# Patient Record
Sex: MALE | Race: WHITE | NOT HISPANIC OR LATINO
[De-identification: names, ages, dates, MRNs, and addresses within clinical notes are randomized per-mention and may not be internally consistent; named-entity substitution may affect disease eponyms.]

---

## 2021-06-23 PROBLEM — Z00.00 ENCOUNTER FOR PREVENTIVE HEALTH EXAMINATION: Status: ACTIVE | Noted: 2021-06-23

## 2021-06-25 ENCOUNTER — APPOINTMENT (OUTPATIENT)
Dept: NEUROSURGERY | Facility: CLINIC | Age: 76
End: 2021-06-25
Payer: MEDICARE

## 2021-06-25 VITALS — HEIGHT: 66 IN | BODY MASS INDEX: 23.46 KG/M2 | WEIGHT: 146 LBS

## 2021-06-25 PROCEDURE — 99205 OFFICE O/P NEW HI 60 MIN: CPT

## 2021-06-25 PROCEDURE — 99072 ADDL SUPL MATRL&STAF TM PHE: CPT

## 2021-06-25 NOTE — HISTORY OF PRESENT ILLNESS
[FreeTextEntry1] : back pain [de-identified] : Mr. Adams is a lorena 76 year-old healthy, physically active male who presents with acute onset low back pain.  The pain radiates to his right lower abdomen.  It is severe.  He has no radicular component to the pain.  No numbness or weakness.  He denies unsteady gait.  Nothing has helped the pain.  It is made worse my laying down. 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  It extends out the foramen on the right

## 2021-06-25 NOTE — PLAN
[FreeTextEntry1] : I discussed the MRI and clinical findings with Mr. Adams and his daughter in detail.  The lesion is most consistent with schwanoma.  However, there is significant cord compression.  I believe surgery is indicated here.  I recommend laminectomy and resection.  I discussed the risks and benefits.  He will think about this option and call with his decision.

## 2021-08-06 ENCOUNTER — INPATIENT (INPATIENT)
Facility: HOSPITAL | Age: 76
LOS: 0 days | Discharge: HOME | End: 2021-08-07
Attending: INTERNAL MEDICINE | Admitting: INTERNAL MEDICINE
Payer: MEDICARE

## 2021-08-06 VITALS
DIASTOLIC BLOOD PRESSURE: 64 MMHG | HEART RATE: 78 BPM | WEIGHT: 134.92 LBS | RESPIRATION RATE: 18 BRPM | OXYGEN SATURATION: 99 % | SYSTOLIC BLOOD PRESSURE: 136 MMHG | TEMPERATURE: 98 F

## 2021-08-06 LAB
ALBUMIN SERPL ELPH-MCNC: 4.8 G/DL — SIGNIFICANT CHANGE UP (ref 3.5–5.2)
ALP SERPL-CCNC: 49 U/L — SIGNIFICANT CHANGE UP (ref 30–115)
ALT FLD-CCNC: 17 U/L — SIGNIFICANT CHANGE UP (ref 0–41)
ANION GAP SERPL CALC-SCNC: 9 MMOL/L — SIGNIFICANT CHANGE UP (ref 7–14)
APTT BLD: 24 SEC — LOW (ref 27–39.2)
AST SERPL-CCNC: 23 U/L — SIGNIFICANT CHANGE UP (ref 0–41)
BASOPHILS # BLD AUTO: 0.02 K/UL — SIGNIFICANT CHANGE UP (ref 0–0.2)
BASOPHILS NFR BLD AUTO: 0.4 % — SIGNIFICANT CHANGE UP (ref 0–1)
BILIRUB SERPL-MCNC: 1.8 MG/DL — HIGH (ref 0.2–1.2)
BLD GP AB SCN SERPL QL: SIGNIFICANT CHANGE UP
BLD GP AB SCN SERPL QL: SIGNIFICANT CHANGE UP
BUN SERPL-MCNC: 15 MG/DL — SIGNIFICANT CHANGE UP (ref 10–20)
CALCIUM SERPL-MCNC: 9.8 MG/DL — SIGNIFICANT CHANGE UP (ref 8.5–10.1)
CHLORIDE SERPL-SCNC: 99 MMOL/L — SIGNIFICANT CHANGE UP (ref 98–110)
CO2 SERPL-SCNC: 29 MMOL/L — SIGNIFICANT CHANGE UP (ref 17–32)
CREAT SERPL-MCNC: 0.9 MG/DL — SIGNIFICANT CHANGE UP (ref 0.7–1.5)
EOSINOPHIL # BLD AUTO: 0.04 K/UL — SIGNIFICANT CHANGE UP (ref 0–0.7)
EOSINOPHIL NFR BLD AUTO: 0.7 % — SIGNIFICANT CHANGE UP (ref 0–8)
GLUCOSE SERPL-MCNC: 87 MG/DL — SIGNIFICANT CHANGE UP (ref 70–99)
HCT VFR BLD CALC: 42.9 % — SIGNIFICANT CHANGE UP (ref 37–47)
HGB BLD-MCNC: 14.7 G/DL — SIGNIFICANT CHANGE UP (ref 12–16)
IMM GRANULOCYTES NFR BLD AUTO: 0.2 % — SIGNIFICANT CHANGE UP (ref 0.1–0.3)
INR BLD: 0.99 RATIO — SIGNIFICANT CHANGE UP (ref 0.65–1.3)
LYMPHOCYTES # BLD AUTO: 1.77 K/UL — SIGNIFICANT CHANGE UP (ref 1.2–3.4)
LYMPHOCYTES # BLD AUTO: 32.9 % — SIGNIFICANT CHANGE UP (ref 20.5–51.1)
MAGNESIUM SERPL-MCNC: 2.2 MG/DL — SIGNIFICANT CHANGE UP (ref 1.8–2.4)
MCHC RBC-ENTMCNC: 33 PG — HIGH (ref 27–31)
MCHC RBC-ENTMCNC: 34.3 G/DL — SIGNIFICANT CHANGE UP (ref 32–37)
MCV RBC AUTO: 96.2 FL — SIGNIFICANT CHANGE UP (ref 81–99)
MONOCYTES # BLD AUTO: 0.53 K/UL — SIGNIFICANT CHANGE UP (ref 0.1–0.6)
MONOCYTES NFR BLD AUTO: 9.9 % — HIGH (ref 1.7–9.3)
NEUTROPHILS # BLD AUTO: 3.01 K/UL — SIGNIFICANT CHANGE UP (ref 1.4–6.5)
NEUTROPHILS NFR BLD AUTO: 55.9 % — SIGNIFICANT CHANGE UP (ref 42.2–75.2)
NRBC # BLD: 0 /100 WBCS — SIGNIFICANT CHANGE UP (ref 0–0)
NT-PROBNP SERPL-SCNC: 228 PG/ML — SIGNIFICANT CHANGE UP (ref 0–300)
PLATELET # BLD AUTO: 230 K/UL — SIGNIFICANT CHANGE UP (ref 130–400)
POTASSIUM SERPL-MCNC: 4.8 MMOL/L — SIGNIFICANT CHANGE UP (ref 3.5–5)
POTASSIUM SERPL-SCNC: 4.8 MMOL/L — SIGNIFICANT CHANGE UP (ref 3.5–5)
PROT SERPL-MCNC: 6.8 G/DL — SIGNIFICANT CHANGE UP (ref 6–8)
PROTHROM AB SERPL-ACNC: 11.4 SEC — SIGNIFICANT CHANGE UP (ref 9.95–12.87)
RAPID RVP RESULT: SIGNIFICANT CHANGE UP
RBC # BLD: 4.46 M/UL — SIGNIFICANT CHANGE UP (ref 4.2–5.4)
RBC # FLD: 12.2 % — SIGNIFICANT CHANGE UP (ref 11.5–14.5)
SARS-COV-2 RNA SPEC QL NAA+PROBE: SIGNIFICANT CHANGE UP
SODIUM SERPL-SCNC: 137 MMOL/L — SIGNIFICANT CHANGE UP (ref 135–146)
TROPONIN T SERPL-MCNC: 0.02 NG/ML — HIGH
WBC # BLD: 5.38 K/UL — SIGNIFICANT CHANGE UP (ref 4.8–10.8)
WBC # FLD AUTO: 5.38 K/UL — SIGNIFICANT CHANGE UP (ref 4.8–10.8)

## 2021-08-06 PROCEDURE — 99285 EMERGENCY DEPT VISIT HI MDM: CPT

## 2021-08-06 PROCEDURE — 99222 1ST HOSP IP/OBS MODERATE 55: CPT | Mod: 57

## 2021-08-06 PROCEDURE — 93010 ELECTROCARDIOGRAM REPORT: CPT

## 2021-08-06 PROCEDURE — 99223 1ST HOSP IP/OBS HIGH 75: CPT

## 2021-08-06 PROCEDURE — 71045 X-RAY EXAM CHEST 1 VIEW: CPT | Mod: 26

## 2021-08-06 PROCEDURE — 93458 L HRT ARTERY/VENTRICLE ANGIO: CPT | Mod: 26

## 2021-08-06 RX ORDER — SODIUM CHLORIDE 9 MG/ML
1000 INJECTION, SOLUTION INTRAVENOUS
Refills: 0 | Status: DISCONTINUED | OUTPATIENT
Start: 2021-08-06 | End: 2021-08-06

## 2021-08-06 RX ORDER — LISINOPRIL 2.5 MG/1
10 TABLET ORAL DAILY
Refills: 0 | Status: DISCONTINUED | OUTPATIENT
Start: 2021-08-06 | End: 2021-08-07

## 2021-08-06 RX ORDER — ATORVASTATIN CALCIUM 80 MG/1
80 TABLET, FILM COATED ORAL AT BEDTIME
Refills: 0 | Status: DISCONTINUED | OUTPATIENT
Start: 2021-08-06 | End: 2021-08-06

## 2021-08-06 RX ORDER — PANTOPRAZOLE SODIUM 20 MG/1
40 TABLET, DELAYED RELEASE ORAL
Refills: 0 | Status: DISCONTINUED | OUTPATIENT
Start: 2021-08-06 | End: 2021-08-07

## 2021-08-06 RX ORDER — ATORVASTATIN CALCIUM 80 MG/1
40 TABLET, FILM COATED ORAL AT BEDTIME
Refills: 0 | Status: DISCONTINUED | OUTPATIENT
Start: 2021-08-06 | End: 2021-08-06

## 2021-08-06 RX ORDER — SODIUM CHLORIDE 9 MG/ML
1000 INJECTION INTRAMUSCULAR; INTRAVENOUS; SUBCUTANEOUS
Refills: 0 | Status: DISCONTINUED | OUTPATIENT
Start: 2021-08-06 | End: 2021-08-06

## 2021-08-06 RX ORDER — LANOLIN ALCOHOL/MO/W.PET/CERES
3 CREAM (GRAM) TOPICAL AT BEDTIME
Refills: 0 | Status: DISCONTINUED | OUTPATIENT
Start: 2021-08-06 | End: 2021-08-07

## 2021-08-06 RX ORDER — ASPIRIN/CALCIUM CARB/MAGNESIUM 324 MG
81 TABLET ORAL DAILY
Refills: 0 | Status: DISCONTINUED | OUTPATIENT
Start: 2021-08-06 | End: 2021-08-07

## 2021-08-06 RX ORDER — ENOXAPARIN SODIUM 100 MG/ML
40 INJECTION SUBCUTANEOUS DAILY
Refills: 0 | Status: DISCONTINUED | OUTPATIENT
Start: 2021-08-06 | End: 2021-08-07

## 2021-08-06 RX ORDER — ATORVASTATIN CALCIUM 80 MG/1
80 TABLET, FILM COATED ORAL AT BEDTIME
Refills: 0 | Status: DISCONTINUED | OUTPATIENT
Start: 2021-08-06 | End: 2021-08-07

## 2021-08-06 RX ORDER — ACETAMINOPHEN 500 MG
650 TABLET ORAL EVERY 6 HOURS
Refills: 0 | Status: DISCONTINUED | OUTPATIENT
Start: 2021-08-06 | End: 2021-08-07

## 2021-08-06 RX ORDER — ONDANSETRON 8 MG/1
4 TABLET, FILM COATED ORAL EVERY 8 HOURS
Refills: 0 | Status: DISCONTINUED | OUTPATIENT
Start: 2021-08-06 | End: 2021-08-07

## 2021-08-06 RX ORDER — OXYCODONE AND ACETAMINOPHEN 5; 325 MG/1; MG/1
1 TABLET ORAL EVERY 6 HOURS
Refills: 0 | Status: DISCONTINUED | OUTPATIENT
Start: 2021-08-06 | End: 2021-08-07

## 2021-08-06 RX ORDER — AMLODIPINE BESYLATE 2.5 MG/1
5 TABLET ORAL DAILY
Refills: 0 | Status: DISCONTINUED | OUTPATIENT
Start: 2021-08-06 | End: 2021-08-07

## 2021-08-06 RX ORDER — SODIUM CHLORIDE 9 MG/ML
1000 INJECTION INTRAMUSCULAR; INTRAVENOUS; SUBCUTANEOUS
Refills: 0 | Status: DISCONTINUED | OUTPATIENT
Start: 2021-08-06 | End: 2021-08-07

## 2021-08-06 RX ORDER — METOPROLOL TARTRATE 50 MG
25 TABLET ORAL EVERY 12 HOURS
Refills: 0 | Status: DISCONTINUED | OUTPATIENT
Start: 2021-08-06 | End: 2021-08-06

## 2021-08-06 RX ORDER — METOPROLOL TARTRATE 50 MG
12.5 TABLET ORAL
Refills: 0 | Status: DISCONTINUED | OUTPATIENT
Start: 2021-08-06 | End: 2021-08-07

## 2021-08-06 RX ADMIN — OXYCODONE AND ACETAMINOPHEN 1 TABLET(S): 5; 325 TABLET ORAL at 20:08

## 2021-08-06 RX ADMIN — ATORVASTATIN CALCIUM 80 MILLIGRAM(S): 80 TABLET, FILM COATED ORAL at 22:18

## 2021-08-06 RX ADMIN — SODIUM CHLORIDE 100 MILLILITER(S): 9 INJECTION INTRAMUSCULAR; INTRAVENOUS; SUBCUTANEOUS at 17:17

## 2021-08-06 RX ADMIN — OXYCODONE AND ACETAMINOPHEN 1 TABLET(S): 5; 325 TABLET ORAL at 21:07

## 2021-08-06 NOTE — H&P ADULT - NSHPSOCIALHISTORY_GEN_ALL_CORE
Denies smoking, alcohol or illicit drug use. Smoked occasionally over 50 years ago. Drinks only socially and lives with family. Fully functional and mobile.

## 2021-08-06 NOTE — H&P ADULT - NSHPLABSRESULTS_GEN_ALL_CORE
14.7   5.38  )-----------( 230      ( 06 Aug 2021 11:00 )             42.9       08-06    137  |  99  |  15  ----------------------------<  87  4.8   |  29  |  0.9    Ca    9.8      06 Aug 2021 11:00  Mg     2.2     08-06    TPro  6.8  /  Alb  4.8  /  TBili  1.8<H>  /  DBili  x   /  AST  23  /  ALT  17  /  AlkPhos  49  08-06                      CARDIAC MARKERS ( 06 Aug 2021 11:00 )  x     / 0.02 ng/mL / x     / x     / x            CAPILLARY BLOOD GLUCOSE

## 2021-08-06 NOTE — CONSULT NOTE ADULT - ASSESSMENT
Impression:    CAD, r/o ACS  Geo    Reccomendations:  -cath today in 2 hours as add-on  -start IVF  -keep NPO  -rapid COVID swab for cath, to be done in house  -get CMP, CBC, T&S, cardiac enzymes  -get EKG Impression:    CAD, r/o ACS  Geo    Reccomendations:  -cath today in 2 hours as add-on  -start IVF  -keep NPO  -rapid COVID swab for cath, to be done in house  -get CMP, CBC, T&S, cardiac enzymes  -get EKG  -further recs after cath Impression:    CAD, RCA disease corroborated on cath today  Geo    Reccomendations:  -cath today showed extensive RCA disease: ostial, mRCA and dRCA  -IVF  -increase statin to 80  -start metoprolol  -will discuss further with neurosurgery about options regarding procedure

## 2021-08-06 NOTE — CHART NOTE - NSCHARTNOTEFT_GEN_A_CORE
PRE-OP DIAGNOSIS: Abnormal CCTA    PROCEDURE:[X] LHC                         [X] Coronary angiography                         [  ] [facility]    Physician: Dr. Lopez  Interventional Fellow: Dr. Castillo   Fellow: Dr. Marie    ANESTHESIA TYPE:  [  ]General Anesthesia  [ x ] Sedation  [  ] Local/Regional    ESTIMATED BLOOD LOSS:    10   mL    CONDITION  [  ] Critical  [  ] Serious  [  ]Fair  [ x ]Good    IV CONTRAST:    60         mL    FINDINGS  Left Heart Catheterization:  LVEF%:  LVEDP: normal  [ ] Normal Coronary Arteries  [ ] Luminal Irregularities  [ ] Non-obstructive CAD    ACCESS:    [X] right radial artery  [ ] right femoral artery    HEMOSTASIS:    [X] D-Stat  [ ] Perclose  [ ] Manual compression    LEFT HEART CATHETERIZATION                                    Left main: Normal    LAD: Mild diffuse disease  Diag1: Moderate disease in proximal segment    Left Circumflex: Mild disease  OM: Mild disease    Right Coronary Artery:       Ostial: 70-80% stenosis       Prox: 60-70% stenosis       Dist: 90% stenosis    SYNTAX I/II SCORE:    INTERVENTION: none  IMPLANTS: none    APPROPRIATE USE CRITERIA (AUC):    SPECIMEN REMOVED: N/A    RIGHT HEART CATHETERIZATION  PA:  PCW:  CO/CI:      POST-OP DIAGNOSIS  Significant RCA        PLAN OF CARE  [X] NS at 100cc/hr for 6 hours  [X] Aspirin 81mg daily  [X] Start Atorvastatin 80mg daily and metoprolol 25mg q12h  [X] Will discuss with neurosurgery and make decision regarding revascularization PRE-OP DIAGNOSIS: Abnormal CCTA    PROCEDURE:[X] LHC                         [X] Coronary angiography                         [  ] [facility]    Physician: Dr. Lopez  Interventional Fellow: Dr. Castillo   Fellow: Dr. Marei    ANESTHESIA TYPE:  [  ]General Anesthesia  [ x ] Sedation  [  ] Local/Regional    ESTIMATED BLOOD LOSS:    10   mL    CONDITION  [  ] Critical  [  ] Serious  [  ]Fair  [ x ]Good    IV CONTRAST:    60         mL    FINDINGS  Left Heart Catheterization:  LVEF%:  LVEDP: normal  [ ] Normal Coronary Arteries  [ ] Luminal Irregularities  [ ] Non-obstructive CAD    ACCESS:    [X] right radial artery  [ ] right femoral artery    HEMOSTASIS:    [X] D-Stat  [ ] Perclose  [ ] Manual compression    LEFT HEART CATHETERIZATION                                    Left main: Normal    LAD: Mild diffuse disease  Diag1: Moderate disease in proximal segment    Left Circumflex: Mild disease  OM: Mild disease    Right Coronary Artery:       Ostial: 70-80% stenosis       Prox: 60-70% stenosis       Dist: 90% stenosis    SYNTAX I/II SCORE:    INTERVENTION: none  IMPLANTS: none    APPROPRIATE USE CRITERIA (AUC):    SPECIMEN REMOVED: N/A    RIGHT HEART CATHETERIZATION  PA:  PCW:  CO/CI:      POST-OP DIAGNOSIS  Significant RCA disease        PLAN OF CARE  [X] NS at 100cc/hr for 6 hours  [X] Aspirin 81mg daily  [X] Start Atorvastatin 80mg daily and metoprolol 25mg q12h  [X] Will discuss with neurosurgery and make decision regarding revascularization PRE-OP DIAGNOSIS: Abnormal CCTA    PROCEDURE:[X] LHC                         [X] Coronary angiography                         [  ] [facility]    Physician: Dr. Lopez  Interventional Fellow: Dr. Castillo   Fellow: Dr. Marie    ANESTHESIA TYPE:  [  ]General Anesthesia  [ x ] Sedation  [  ] Local/Regional    ESTIMATED BLOOD LOSS:    10   mL    CONDITION  [  ] Critical  [  ] Serious  [  ]Fair  [ x ]Good    IV CONTRAST:    60         mL    FINDINGS  Left Heart Catheterization:  LVEF%:  LVEDP: normal  [ ] Normal Coronary Arteries  [ ] Luminal Irregularities  [ ] Non-obstructive CAD    ACCESS:    [X] right radial artery  [ ] right femoral artery    HEMOSTASIS:    [X] D-Stat  [ ] Perclose  [ ] Manual compression    LEFT HEART CATHETERIZATION                                    Left main: Normal    LAD: Mild diffuse disease  Diag1: Moderate disease in proximal segment    Left Circumflex: Mild disease  OM: Mild disease    Right Coronary Artery:       Ostial: 70-80% stenosis       Prox: 60-70% stenosis       Dist: 90% stenosis    SYNTAX I/II SCORE:    INTERVENTION: none  IMPLANTS: none    APPROPRIATE USE CRITERIA (AUC):    SPECIMEN REMOVED: N/A    RIGHT HEART CATHETERIZATION  PA:  PCW:  CO/CI:      POST-OP DIAGNOSIS  Significant RCA disease        PLAN OF CARE  [X] NS at 100cc/hr for 6 hours  [X] Aspirin 81mg daily  [X] Start Atorvastatin 80mg daily and metoprolol 12. 5mg q12h  [X] Will discuss with neurosurgery and make decision regarding revascularization

## 2021-08-06 NOTE — H&P ADULT - ATTENDING COMMENTS
Patient seen and examined on 08/06/2021 at 23:35    HPI:  76 year old gentleman with a past medical history of HTN, DLD, recently diagnosed Schwannoma presents after having a positive CCTA.  He was found to have a positive CCTA on outpatient pre-op work up (was to have his schwannoma resected by Dr Ann). He reports occasional chest pressure, shortness of breath, and palpitations with exertion at home. Currently he feels well. He does have significant back pain since the diagnosis of the schwannoma.  Underwent cardiac cath earlier today with revealed severe RCA disease but no intervention was done.     REVIEW OF SYSTEMS:  CONSTITUTIONAL:  No weakness, fevers, chills, night sweats, weight loss  EYES/ENT: No visual changes. No vertigo or dysphagia  NECK: No neck pain or stiffness  RESPIRATORY: No cough, wheezing, hemoptysis. +shortness of breath  CARDIOVASCULAR: +chest pain +palpitations. No lower extremity edema  GASTROINTESTINAL: No abdominal pain. No nausea, vomiting, diarrhea, or hematemesis  GENITOURINARY: No dysuria or hematuria   NEUROLOGICAL: No focal numbness or weakness  MSK +back pain   SKIN: No rashes or itching  HEMATOLOGIC: No easy bruising or prolonged bleeding.      PHYSICAL EXAM:  GENERAL: NAD, well-developed, Non-toxic, stated age   HEAD:  Atraumatic, Normocephalic  EYES: EOMI, Sclera White   NECK: Supple, No JVD  CHEST/LUNG: Clear to auscultation bilaterally; No wheezing, rhonchi, or crackles  HEART: Regular rate and rhythm; s1, s2, No murmurs, rubs, or gallops  ABDOMEN: Soft, Nontender, Nondistended; Bowel sounds present, No rebound or guarding noted   EXTREMITIES:  No lower extremity edema or calf tenderness to palpation.  No clubbing or cyanosis  PSYCH: AAOx3, pleasant, cooperative, not anxious  NEUROLOGY: non-focal  SKIN: No rashes or lesions      ASSESSMENT AND PLAN:  CAD with RCA severe disease: currently asymptomatic  Elevated trops  HTN / HLD  -Cardio following: need to reach out to neuro sx to coordinate stent placement with back surgery (he will to remain on DAPT after stenting, so teams to decide which procedure to perform first).  -Cont with metoprolol 12.5mg BID, lipitor 80mg, lisinopril 10mg, and ASA 81mg.   -AM EKG, cont tele monitoring. Check 2D echo     Schwannoma  Chronic back pain  -Cont with percocet PRN, start senna and miralax  -Neuro Sx to coordinate with cardiology regarding surgery.     DVT ppx: Lovenox/Heparin  GI ppx: Not indicated  GOC: Full code.    My note supersedes the residents in the event of a discrepancy.

## 2021-08-06 NOTE — ED ADULT TRIAGE NOTE - CHIEF COMPLAINT QUOTE
midsternal chest pain radiating to left arm x 1 week, denies fevers, recent CTA shows blockage, MD Lopez told patient to come in for evaluation

## 2021-08-06 NOTE — ED PROVIDER NOTE - ATTENDING CONTRIBUTION TO CARE
77 y/o m w/ pmhx of HTN, HLD, recent pre op evaluation for schwannoma removal revealing CAD after a positive CCTA showing severe RCA stenosis and moderate LCX stenosis. pt was having back paiin and has reporting pain to chest , was found to have schwannoma and was sent for ccta due to various symptoms and pain, daughter spoke to cardiologist Dr. Lopez who sent pt in for pre op for cath. No fever, chills, n/v, current cp,  pleuritic cp, sob, palpitations, diaphoresis, cough, ha/lh/dizziness, neck pain/ stiffness, abd pain, diarrhea, constipation, melena/brbpr, urinary symptoms, trauma, weakness, edema, calf pain/swelling/erythema, sick contacts, recent travel or rash. smoked in his teenage for years for few years.    Vital Signs: I have reviewed the initial vital signs. Constitutional: WDWN in nad. Sitting on stretcher speaking full sentences. Integumentary: No rash. ENT: MMM NECK: Supple, non-tender, no meningeal signs. Cardiovascular: RRR, radial pulses 2/4 b/l. No JVD. Respiratory: BS present b/l, ctabl, no wheezing or crackles, no accessory muscle use, no stridor. Gastrointestinal: BS present throughout all 4 quadrants, soft, nd, nt, no rebound tenderness or guarding, no cvat. Musculoskeletal: FROM, no edema, no calf pain/swelling/erythema. Neurologic: AAOx3, motor 5/5 and sensation intact throughout upper and lowe ext, CN II-XII intact, No facial droop or slurring of speech. No focal deficits.

## 2021-08-06 NOTE — H&P ADULT - NSHPPHYSICALEXAM_GEN_ALL_CORE
GENERAL: NAD, lying in bed comfortably  HEAD:  Atraumatic, Normocephalic  EYES: EOMI, PERRLA, conjunctiva and sclera clear  CHEST/LUNG: Clear to auscultation bilaterally  HEART: Regular rate and rhythm  ABDOMEN: Bowel sounds present; Soft, Nontender, Nondistended  EXTREMITIES No peripheral edema  NERVOUS SYSTEM:  Alert & Oriented X3, speech clear. No motor deficits   MSK: Tenderness to lower back and paraspinal areas  SKIN: No rashes or lesions

## 2021-08-06 NOTE — ED PROVIDER NOTE - CARE PLAN
Assessment and plan of treatment:	Plan: Monitor, EKG, CXR, labs, cardiology consult, reassess.   Principal Discharge DX:	Chest pain  Assessment and plan of treatment:	Plan: Monitor, EKG, CXR, labs, cardiology consult, reassess.

## 2021-08-06 NOTE — H&P ADULT - HISTORY OF PRESENT ILLNESS
76 year old Male with Past Medical History of HTN, DLD, recently diagnosed Schwannoma presents after having a positive CCTA.    Patient with daughter at the bedside reports pressure like intermittent chest pain for the last week, mostly substernal mild to moderate without any significant alleviating or aggravating factors. Patient confused it with his back pain which he has with the schwannoma. Patient during pre-op evaluation for a schwannoma removal had a CCTA showing severe RCA stenosis and moderate LCX stenosis. He was referred to hospital for cath. Denies any fever, chest pain, abdominal pain, nausea, vomiting, diarrhea or any other complaints.    In ED patient hemodynamically stable, afebrile, troponin 0.02, Cardiology plan for a cath today. At time of interview only complaint is back pain.

## 2021-08-06 NOTE — PATIENT PROFILE ADULT - HEALTH LITERACY
Patient seen in ER 3/25/18, diagnosed with UTI and put on Cefdinir.  Once culture/sensitivities were back pt was then changed to Macrobid x7 days which she finished today.  She is concerned that the Macrobid may not work as she has not had good luck with it over the past 53 years when she has had UTIs.  Her mother  3/25 from sepsis and pneumonia so pt is fearful about getting septic from a UTI.  Patient complains of aching along entire low back, dull pain in pelvis like a bearing down sensation and aching along fronts of upper legs and states her urine looks darker in color.  Denies vaginal discharge, urinary frequency, dysuria or fever.  States she gets aching along fronts of upper legs when she gets a UTI or a vaginitis.  Recent vaginal culture came back clear per pt.       Wants to be treated again with a different antibiotic (allergic to Sulfa, can take Penicillin VK though PCN is listed as allergy).  Also wants UC ordered once she is done with antibiotic so she can be certain infection is gone.  EDMAR Jaeger R.N.     no

## 2021-08-06 NOTE — ED ADULT NURSE REASSESSMENT NOTE - NS ED NURSE REASSESS COMMENT FT1
Pt assessed A/O times 4 Vs stable on cardiac monitor denies no pain no SOB no N/V no dizziness on cardiac monitor is seen evaluate by ED attending , ready to be transport for cath lab with transporter report is given to RN waiting for transport .

## 2021-08-06 NOTE — CONSULT NOTE ADULT - ATTENDING COMMENTS
severe single vessel cad  pre op for neurosx intervention  extensive rca ds, would likely require NORBERT  will d/w the Dr. Ann and anesthesia prior to revascularization  meantime, add low dose bb  increase statin to 80  cont asa 81 daily

## 2021-08-06 NOTE — ED ADULT NURSE NOTE - OBJECTIVE STATEMENT
Pt from home with C.O upper back chest from March of this  year , C/O  left side chest pain fpr 1 week radiete to the back on and off for 2 weeks;

## 2021-08-06 NOTE — ED PROVIDER NOTE - OBJECTIVE STATEMENT
76M hx HTN, HLD, schwannoma presents with chest pain x 1 week. Daughter notes he has been having mid sternal chest discomfort, associated with fatigue. Patient saw a cardiologist in [place], had a CCTA done that demonstrated RCA stenosis. He called Dr. Lopez, and was informed to come in.

## 2021-08-06 NOTE — H&P ADULT - ASSESSMENT
76 year old Male with Past Medical History of HTN, DLD, recently diagnosed Schwannoma presents after having a positive CCTA.    Patient likely has obstructive CAD and plan is for cath today. Stat labs for type and screen sent and at time of interview no active complaints.    # Chest Pain 2/2 Obstructive CAD  - 1 week of chest pain, pressure like  - CCTA showing severe RCA and moderate LCX stenosis  - EKG showing bifascicular block  - Plan is for cath today  - IVF with LR @75  - Can start Aspirin 81 (took 180 at home)  - Was prescribed Clopidogrel outpatient yesterday. 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    Patient likely has obstructive CAD and plan is for cath today. Stat labs for type and screen sent and at time of interview no active complaints.    # Substernal Chest Pain 2/2 Obstructive CAD  - 1 week of chest pain, pressure like  - CCTA showing severe RCA and moderate LCX stenosis  - EKG showing bifascicular block, troponin 0.02  - Plan is for cath today  - IVF with LR @75  - Can start Aspirin 81 (took 180 at home)  - Was prescribed Clopidogrel outpatient yesterday. 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

## 2021-08-06 NOTE — CONSULT NOTE ADULT - SUBJECTIVE AND OBJECTIVE BOX
HPI:    76M with PMHx CAD presenting after having a positive CCTA during pre-op evaluation for a schwanoma removal. CCTA showed severe RCA stenosis and moderate LCX stenosis. As per the daughter he has been feeling off the past few days and started to tingling in his fingers.    PAST MEDICAL & SURGICAL HISTORY      FAMILY HISTORY:  FAMILY HISTORY:      SOCIAL HISTORY:  []smoker  []Alcohol  []Drug    ALLERGIES:  No Known Allergies      MEDICATIONS:  MEDICATIONS  (STANDING):    MEDICATIONS  (PRN):      HOME MEDICATIONS:  Home Medications:      VITALS:   T(F): 98 (08-06 @ 11:11), Max: 98 (08-06 @ 11:11)  HR: 78 (08-06 @ 11:11) (78 - 78)  BP: 136/64 (08-06 @ 11:11) (136/64 - 136/64)  BP(mean): --  RR: 18 (08-06 @ 11:11) (18 - 18)  SpO2: 99% (08-06 @ 11:11) (99% - 99%)    I&O's Summary      REVIEW OF SYSTEMS:  CONSTITUTIONAL: No weakness, fevers or chills  EYES: No visual changes  ENT: No vertigo or throat pain   NECK: No pain or stiffness  RESPIRATORY: No cough, wheezing, hemoptysis; No shortness of breath  CARDIOVASCULAR: No chest pain or palpitations  GASTROINTESTINAL: No abdominal or epigastric pain. No nausea, vomiting, or hematemesis; No diarrhea or constipation. No melena or hematochezia.  GENITOURINARY: No dysuria, frequency or hematuria  NEUROLOGICAL: No numbness or weakness  SKIN: No itching, no rashes    PHYSICAL EXAM:  NEURO: patient is awake , alert and oriented  GEN: Not in acute distress  NECK: no thyroid enlargement, no JVD  LUNGS: Clear to auscultation bilaterally   CARDIOVASCULAR: S1/S2 present, RRR , no murmurs or rubs, no carotid bruits,  + PP bilaterally  ABD: Soft, non-tender, non-distended, +BS  EXT: No HORACE  SKIN: Intact    LABS: pending                    Troponin trend:            RADIOLOGY:  -CXR:  -TTE:  -CCTA: severe RCA stenosis, mod LCX  -STRESS TEST:  -CATHETERIZATION:    ECG:    TELEMETRY EVENTS:   HPI:    76M with PMHx CAD presenting after having a positive CCTA during pre-op evaluation for a schwanoma removal. CCTA showed severe RCA stenosis and moderate LCX stenosis. As per the daughter he has been feeling off the past few days and started to tingling in his fingers as well as chest pain.    PAST MEDICAL & SURGICAL HISTORY  HTN  DLD  Schwannoma    hernia  nephrolithiasis    FAMILY HISTORY:  FAMILY HISTORY:      SOCIAL HISTORY:  []smoker  []Alcohol  []Drug    ALLERGIES:  No Known Allergies    MEDICATIONS:  MEDICATIONS  (STANDING):    MEDICATIONS  (PRN):      HOME MEDICATIONS:  Home Medications:    ASA 81mg  atorvastatin 20mg every other day  amlodipine-benazepril 5-10mg  Tylenol 650mg PRN    VITALS:   T(F): 98 (08-06 @ 11:11), Max: 98 (08-06 @ 11:11)  HR: 78 (08-06 @ 11:11) (78 - 78)  BP: 136/64 (08-06 @ 11:11) (136/64 - 136/64)  BP(mean): --  RR: 18 (08-06 @ 11:11) (18 - 18)  SpO2: 99% (08-06 @ 11:11) (99% - 99%)      REVIEW OF SYSTEMS:  CONSTITUTIONAL: No weakness, fevers or chills  EYES: No visual changes  ENT: No vertigo or throat pain   NECK: No pain or stiffness  RESPIRATORY: No cough, wheezing, hemoptysis; No shortness of breath  CARDIOVASCULAR: + chest pain No palpitations  GASTROINTESTINAL: No abdominal or epigastric pain. No nausea, vomiting, or hematemesis; No diarrhea or constipation. No melena or hematochezia.  GENITOURINARY: No dysuria, frequency or hematuria  NEUROLOGICAL: No numbness or weakness. finger tingling  SKIN: No itching, no rashes    PHYSICAL EXAM:  NEURO: patient is awake , alert and oriented  GEN: Not in acute distress  NECK: no thyroid enlargement, no JVD  LUNGS: Clear to auscultation bilaterally   CARDIOVASCULAR: S1/S2 present, RRR , no murmurs or rubs, no carotid bruits,  + PP bilaterally  ABD: Soft, non-tender, non-distended, +BS  EXT: No HORACE  SKIN: Intact    LABS: pending            Troponin trend:            RADIOLOGY:  -CXR:  -TTE:  -CCTA: severe RCA stenosis, mod LCX  -STRESS TEST:  -CATHETERIZATION:    ECG:    TELEMETRY EVENTS: sinus rhythm   HPI:    76M with PMHx CAD presenting after having a positive CCTA during pre-op evaluation for a schwanoma removal. CCTA showed severe RCA stenosis and moderate LCX stenosis. As per the daughter he has been feeling off the past few days and started to tingling in his fingers as well as chest pain.    PAST MEDICAL & SURGICAL HISTORY  HTN  DLD  Schwannoma    hernia  nephrolithiasis    FAMILY HISTORY:  FAMILY HISTORY:      SOCIAL HISTORY:  []smoker  []Alcohol  []Drug    ALLERGIES:  No Known Allergies    MEDICATIONS:  MEDICATIONS  (STANDING):    MEDICATIONS  (PRN):      HOME MEDICATIONS:  Home Medications:    ASA 81mg  atorvastatin 20mg every other day  amlodipine-benazepril 5-10mg  Tylenol 650mg PRN    VITALS:   T(F): 98 (08-06 @ 11:11), Max: 98 (08-06 @ 11:11)  HR: 78 (08-06 @ 11:11) (78 - 78)  BP: 136/64 (08-06 @ 11:11) (136/64 - 136/64)  BP(mean): --  RR: 18 (08-06 @ 11:11) (18 - 18)  SpO2: 99% (08-06 @ 11:11) (99% - 99%)      REVIEW OF SYSTEMS:  CONSTITUTIONAL: No weakness, fevers or chills  EYES: No visual changes  ENT: No vertigo or throat pain   NECK: No pain or stiffness  RESPIRATORY: No cough, wheezing, hemoptysis; No shortness of breath  CARDIOVASCULAR: + chest pain No palpitations  GASTROINTESTINAL: No abdominal or epigastric pain. No nausea, vomiting, or hematemesis; No diarrhea or constipation. No melena or hematochezia.  GENITOURINARY: No dysuria, frequency or hematuria  NEUROLOGICAL: No numbness or weakness. finger tingling  SKIN: No itching, no rashes    PHYSICAL EXAM:  NEURO: patient is awake , alert and oriented  GEN: Not in acute distress  NECK: no thyroid enlargement, no JVD  LUNGS: Clear to auscultation bilaterally   CARDIOVASCULAR: S1/S2 present, RRR , no murmurs or rubs, no carotid bruits,  + PP bilaterally  ABD: Soft, non-tender, non-distended, +BS  EXT: No HORACE  SKIN: Intact    LABS:  Labs:                        14.7   5.38  )-----------( 230      ( 06 Aug 2021 11:00 )             42.9                                   08-06    137  |  99  |  15  ----------------------------<  87  4.8   |  29  |  0.9    Ca    9.8      06 Aug 2021 11:00  Mg     2.2     08-06    TPro  6.8  /  Alb  4.8  /  TBili  1.8<H>  /  DBili  x   /  AST  23  /  ALT  17  /  AlkPhos  49  08-06    LIVER FUNCTIONS - ( 06 Aug 2021 11:00 )  Alb: 4.8 g/dL / Pro: 6.8 g/dL / ALK PHOS: 49 U/L / ALT: 17 U/L / AST: 23 U/L / GGT: x                                      PT/INR - ( 06 Aug 2021 13:35 )   PT: 11.40 sec;   INR: 0.99 ratio         PTT - ( 06 Aug 2021 13:35 )  PTT:24.0 sec  CARDIAC MARKERS ( 06 Aug 2021 11:00 )  x     / 0.02 ng/mL / x     / x     / x                                     Serum Pro-Brain Natriuretic Peptide: 228 pg/mL (08-06-21 @ 11:00)       Troponin T, Serum: 0.02 ng/mL (08-06-21 @ 11:00)    Serum Pro-Brain Natriuretic Peptide: 228 pg/mL (08-06-21 @ 11:00)        RADIOLOGY:  -CXR:  -TTE:  -CCTA: severe RCA stenosis, mod LCX  -STRESS TEST:  -CATHETERIZATION:     ECG:    TELEMETRY EVENTS: sinus rhythm   HPI:    76M with PMHx CAD presenting with cp and sob upon exertion last few months  Recent  CCTA during pre-op evaluation for a schwanoma removal showed severe RCA stenosis and moderate LCX stenosis. As per the daughter he has been feeling off the past few days and started to tingling in his fingers as well as chest pain.    PAST MEDICAL & SURGICAL HISTORY  HTN  DLD  Schwannoma    hernia  nephrolithiasis    FAMILY HISTORY:  FAMILY HISTORY:      SOCIAL HISTORY:  []smoker  []Alcohol  []Drug    ALLERGIES:  No Known Allergies    MEDICATIONS:  MEDICATIONS  (STANDING):    MEDICATIONS  (PRN):      HOME MEDICATIONS:  Home Medications:    ASA 81mg  atorvastatin 20mg every other day  amlodipine-benazepril 5-10mg  Tylenol 650mg PRN    VITALS:   T(F): 98 (08-06 @ 11:11), Max: 98 (08-06 @ 11:11)  HR: 78 (08-06 @ 11:11) (78 - 78)  BP: 136/64 (08-06 @ 11:11) (136/64 - 136/64)  BP(mean): --  RR: 18 (08-06 @ 11:11) (18 - 18)  SpO2: 99% (08-06 @ 11:11) (99% - 99%)      REVIEW OF SYSTEMS:  CONSTITUTIONAL: No weakness, fevers or chills  EYES: No visual changes  ENT: No vertigo or throat pain   NECK: No pain or stiffness  RESPIRATORY: No cough, wheezing, hemoptysis; No shortness of breath  CARDIOVASCULAR: + chest pain No palpitations  GASTROINTESTINAL: No abdominal or epigastric pain. No nausea, vomiting, or hematemesis; No diarrhea or constipation. No melena or hematochezia.  GENITOURINARY: No dysuria, frequency or hematuria  NEUROLOGICAL: No numbness or weakness. finger tingling  SKIN: No itching, no rashes    PHYSICAL EXAM:  NEURO: patient is awake , alert and oriented  GEN: Not in acute distress  NECK: no thyroid enlargement, no JVD  LUNGS: Clear to auscultation bilaterally   CARDIOVASCULAR: S1/S2 present, RRR , no murmurs or rubs, no carotid bruits,  + PP bilaterally  ABD: Soft, non-tender, non-distended, +BS  EXT: No HORACE  SKIN: Intact    LABS:  Labs:                        14.7   5.38  )-----------( 230      ( 06 Aug 2021 11:00 )             42.9                                   08-06    137  |  99  |  15  ----------------------------<  87  4.8   |  29  |  0.9    Ca    9.8      06 Aug 2021 11:00  Mg     2.2     08-06    TPro  6.8  /  Alb  4.8  /  TBili  1.8<H>  /  DBili  x   /  AST  23  /  ALT  17  /  AlkPhos  49  08-06    LIVER FUNCTIONS - ( 06 Aug 2021 11:00 )  Alb: 4.8 g/dL / Pro: 6.8 g/dL / ALK PHOS: 49 U/L / ALT: 17 U/L / AST: 23 U/L / GGT: x                                      PT/INR - ( 06 Aug 2021 13:35 )   PT: 11.40 sec;   INR: 0.99 ratio         PTT - ( 06 Aug 2021 13:35 )  PTT:24.0 sec  CARDIAC MARKERS ( 06 Aug 2021 11:00 )  x     / 0.02 ng/mL / x     / x     / x                                     Serum Pro-Brain Natriuretic Peptide: 228 pg/mL (08-06-21 @ 11:00)       Troponin T, Serum: 0.02 ng/mL (08-06-21 @ 11:00)    Serum Pro-Brain Natriuretic Peptide: 228 pg/mL (08-06-21 @ 11:00)        RADIOLOGY:  -CXR:  -TTE:  -CCTA: severe RCA stenosis, mod LCX  -STRESS TEST:  -CATHETERIZATION:     ECG:    TELEMETRY EVENTS: sinus rhythm

## 2021-08-06 NOTE — PATIENT PROFILE ADULT - NSTRANSFERBELONGINGSDISPO_GEN_A_NUR
Outpatient Prescriptions Marked as Taking for the 8/27/18 encounter (Refill) with Dwayne Ramirez MD   Medication Sig Dispense Refill   • escitalopram (LEXAPRO) 10 MG tablet 1 QD- HIGHER DOSE 30 tablet 6        Ok to leave detailed Message: Yes  Informed caller of refill policy- 24-48 hours: Yes  No call back needed unless nurse has questions.    with patient

## 2021-08-06 NOTE — ED PROVIDER NOTE - PROGRESS NOTE DETAILS
ED Attending ELENA Oden  cardiology evaluated pt, report admission to CCU, pt and family aware. ED Attending ELENA Oden  cardiology evaluated pt, report admission to tele, pt and family aware.

## 2021-08-07 ENCOUNTER — TRANSCRIPTION ENCOUNTER (OUTPATIENT)
Age: 76
End: 2021-08-07

## 2021-08-07 VITALS — HEIGHT: 66 IN | WEIGHT: 143.52 LBS

## 2021-08-07 LAB
A1C WITH ESTIMATED AVERAGE GLUCOSE RESULT: 5.5 % — SIGNIFICANT CHANGE UP (ref 4–5.6)
ALBUMIN SERPL ELPH-MCNC: 4 G/DL — SIGNIFICANT CHANGE UP (ref 3.5–5.2)
ALP SERPL-CCNC: 45 U/L — SIGNIFICANT CHANGE UP (ref 30–115)
ALT FLD-CCNC: 15 U/L — SIGNIFICANT CHANGE UP (ref 0–41)
ANION GAP SERPL CALC-SCNC: 9 MMOL/L — SIGNIFICANT CHANGE UP (ref 7–14)
AST SERPL-CCNC: 20 U/L — SIGNIFICANT CHANGE UP (ref 0–41)
BASOPHILS # BLD AUTO: 0.03 K/UL — SIGNIFICANT CHANGE UP (ref 0–0.2)
BASOPHILS NFR BLD AUTO: 0.4 % — SIGNIFICANT CHANGE UP (ref 0–1)
BILIRUB SERPL-MCNC: 1.7 MG/DL — HIGH (ref 0.2–1.2)
BUN SERPL-MCNC: 14 MG/DL — SIGNIFICANT CHANGE UP (ref 10–20)
CALCIUM SERPL-MCNC: 8.9 MG/DL — SIGNIFICANT CHANGE UP (ref 8.5–10.1)
CHLORIDE SERPL-SCNC: 102 MMOL/L — SIGNIFICANT CHANGE UP (ref 98–110)
CHOLEST SERPL-MCNC: 112 MG/DL — SIGNIFICANT CHANGE UP
CO2 SERPL-SCNC: 28 MMOL/L — SIGNIFICANT CHANGE UP (ref 17–32)
COVID-19 SPIKE DOMAIN AB INTERP: POSITIVE
COVID-19 SPIKE DOMAIN ANTIBODY RESULT: >250 U/ML — HIGH
CREAT SERPL-MCNC: 0.8 MG/DL — SIGNIFICANT CHANGE UP (ref 0.7–1.5)
EOSINOPHIL # BLD AUTO: 0.05 K/UL — SIGNIFICANT CHANGE UP (ref 0–0.7)
EOSINOPHIL NFR BLD AUTO: 0.7 % — SIGNIFICANT CHANGE UP (ref 0–8)
ESTIMATED AVERAGE GLUCOSE: 111 MG/DL — SIGNIFICANT CHANGE UP (ref 68–114)
GLUCOSE SERPL-MCNC: 78 MG/DL — SIGNIFICANT CHANGE UP (ref 70–99)
HCT VFR BLD CALC: 42 % — SIGNIFICANT CHANGE UP (ref 42–52)
HDLC SERPL-MCNC: 50 MG/DL — SIGNIFICANT CHANGE UP
HGB BLD-MCNC: 14.2 G/DL — SIGNIFICANT CHANGE UP (ref 14–18)
IMM GRANULOCYTES NFR BLD AUTO: 0.3 % — SIGNIFICANT CHANGE UP (ref 0.1–0.3)
LIPID PNL WITH DIRECT LDL SERPL: 46 MG/DL — SIGNIFICANT CHANGE UP
LYMPHOCYTES # BLD AUTO: 1.31 K/UL — SIGNIFICANT CHANGE UP (ref 1.2–3.4)
LYMPHOCYTES # BLD AUTO: 19.6 % — LOW (ref 20.5–51.1)
MCHC RBC-ENTMCNC: 32.1 PG — HIGH (ref 27–31)
MCHC RBC-ENTMCNC: 33.8 G/DL — SIGNIFICANT CHANGE UP (ref 32–37)
MCV RBC AUTO: 95 FL — HIGH (ref 80–94)
MONOCYTES # BLD AUTO: 0.46 K/UL — SIGNIFICANT CHANGE UP (ref 0.1–0.6)
MONOCYTES NFR BLD AUTO: 6.9 % — SIGNIFICANT CHANGE UP (ref 1.7–9.3)
NEUTROPHILS # BLD AUTO: 4.81 K/UL — SIGNIFICANT CHANGE UP (ref 1.4–6.5)
NEUTROPHILS NFR BLD AUTO: 72.1 % — SIGNIFICANT CHANGE UP (ref 42.2–75.2)
NON HDL CHOLESTEROL: 62 MG/DL — SIGNIFICANT CHANGE UP
NRBC # BLD: 0 /100 WBCS — SIGNIFICANT CHANGE UP (ref 0–0)
PLATELET # BLD AUTO: 218 K/UL — SIGNIFICANT CHANGE UP (ref 130–400)
POTASSIUM SERPL-MCNC: 4.3 MMOL/L — SIGNIFICANT CHANGE UP (ref 3.5–5)
POTASSIUM SERPL-SCNC: 4.3 MMOL/L — SIGNIFICANT CHANGE UP (ref 3.5–5)
PROT SERPL-MCNC: 5.8 G/DL — LOW (ref 6–8)
RBC # BLD: 4.42 M/UL — LOW (ref 4.7–6.1)
RBC # FLD: 11.9 % — SIGNIFICANT CHANGE UP (ref 11.5–14.5)
SARS-COV-2 IGG+IGM SERPL QL IA: >250 U/ML — HIGH
SARS-COV-2 IGG+IGM SERPL QL IA: POSITIVE
SODIUM SERPL-SCNC: 139 MMOL/L — SIGNIFICANT CHANGE UP (ref 135–146)
TRIGL SERPL-MCNC: 95 MG/DL — SIGNIFICANT CHANGE UP
WBC # BLD: 6.68 K/UL — SIGNIFICANT CHANGE UP (ref 4.8–10.8)
WBC # FLD AUTO: 6.68 K/UL — SIGNIFICANT CHANGE UP (ref 4.8–10.8)

## 2021-08-07 PROCEDURE — 99232 SBSQ HOSP IP/OBS MODERATE 35: CPT

## 2021-08-07 PROCEDURE — 99239 HOSP IP/OBS DSCHRG MGMT >30: CPT

## 2021-08-07 PROCEDURE — 93010 ELECTROCARDIOGRAM REPORT: CPT

## 2021-08-07 RX ORDER — METOPROLOL TARTRATE 50 MG
12.5 TABLET ORAL
Qty: 0 | Refills: 0 | DISCHARGE
Start: 2021-08-07

## 2021-08-07 RX ORDER — POLYETHYLENE GLYCOL 3350 17 G/17G
17 POWDER, FOR SOLUTION ORAL DAILY
Refills: 0 | Status: DISCONTINUED | OUTPATIENT
Start: 2021-08-07 | End: 2021-08-07

## 2021-08-07 RX ORDER — ASPIRIN/CALCIUM CARB/MAGNESIUM 324 MG
1 TABLET ORAL
Qty: 0 | Refills: 0 | DISCHARGE
Start: 2021-08-07

## 2021-08-07 RX ORDER — METOPROLOL TARTRATE 50 MG
0.5 TABLET ORAL
Qty: 30 | Refills: 0
Start: 2021-08-07 | End: 2021-09-05

## 2021-08-07 RX ORDER — ATORVASTATIN CALCIUM 80 MG/1
1 TABLET, FILM COATED ORAL
Qty: 0 | Refills: 0 | DISCHARGE
Start: 2021-08-07

## 2021-08-07 RX ORDER — SENNA PLUS 8.6 MG/1
2 TABLET ORAL DAILY
Refills: 0 | Status: DISCONTINUED | OUTPATIENT
Start: 2021-08-07 | End: 2021-08-07

## 2021-08-07 RX ORDER — ASPIRIN/CALCIUM CARB/MAGNESIUM 324 MG
1 TABLET ORAL
Qty: 30 | Refills: 0
Start: 2021-08-07 | End: 2021-09-05

## 2021-08-07 RX ORDER — ATORVASTATIN CALCIUM 80 MG/1
1 TABLET, FILM COATED ORAL
Qty: 30 | Refills: 0
Start: 2021-08-07 | End: 2021-09-05

## 2021-08-07 RX ORDER — OXYCODONE AND ACETAMINOPHEN 5; 325 MG/1; MG/1
1 TABLET ORAL EVERY 4 HOURS
Refills: 0 | Status: DISCONTINUED | OUTPATIENT
Start: 2021-08-07 | End: 2021-08-07

## 2021-08-07 RX ADMIN — ENOXAPARIN SODIUM 40 MILLIGRAM(S): 100 INJECTION SUBCUTANEOUS at 11:24

## 2021-08-07 RX ADMIN — OXYCODONE AND ACETAMINOPHEN 1 TABLET(S): 5; 325 TABLET ORAL at 01:03

## 2021-08-07 RX ADMIN — SENNA PLUS 2 TABLET(S): 8.6 TABLET ORAL at 11:24

## 2021-08-07 RX ADMIN — AMLODIPINE BESYLATE 5 MILLIGRAM(S): 2.5 TABLET ORAL at 05:36

## 2021-08-07 RX ADMIN — POLYETHYLENE GLYCOL 3350 17 GRAM(S): 17 POWDER, FOR SOLUTION ORAL at 11:24

## 2021-08-07 RX ADMIN — OXYCODONE AND ACETAMINOPHEN 1 TABLET(S): 5; 325 TABLET ORAL at 05:43

## 2021-08-07 RX ADMIN — OXYCODONE AND ACETAMINOPHEN 1 TABLET(S): 5; 325 TABLET ORAL at 06:16

## 2021-08-07 RX ADMIN — Medication 12.5 MILLIGRAM(S): at 05:36

## 2021-08-07 RX ADMIN — Medication 81 MILLIGRAM(S): at 11:24

## 2021-08-07 RX ADMIN — LISINOPRIL 10 MILLIGRAM(S): 2.5 TABLET ORAL at 05:36

## 2021-08-07 RX ADMIN — OXYCODONE AND ACETAMINOPHEN 1 TABLET(S): 5; 325 TABLET ORAL at 00:29

## 2021-08-07 RX ADMIN — PANTOPRAZOLE SODIUM 40 MILLIGRAM(S): 20 TABLET, DELAYED RELEASE ORAL at 05:36

## 2021-08-07 NOTE — PROGRESS NOTE ADULT - ASSESSMENT
76 year old Male with Past Medical History of HTN, DLD, recently diagnosed Schwannoma presents after having a positive CCTA.    Significant RCA disease  Stable angina  HTN / DL  Schwannoma           PLAN:     ·	S/P cath. Significant RCA disease.   ·	No events on tele  ·	Will cont Metoprolol 12.5 mg po q 12h. Tolerating well  ·	Cont ASA 81 mg po daily and Lipitor 80 mg po qhs.   ·	Cont rest of his home meds  ·	Called pt's daughter and discussed care with her  ·	D/C planning    * Med rec reviewed. Plan of care D/W the pt and his daughter. Time spent 36 minutes.

## 2021-08-07 NOTE — PROGRESS NOTE ADULT - SUBJECTIVE AND OBJECTIVE BOX
VICKWALDEMAR  76y Male    CHIEF COMPLAINT:    Patient is a 76y old  Male who presents with a chief complaint of Chest Pain (06 Aug 2021 14:00)      INTERVAL HPI/OVERNIGHT EVENTS:    Patient seen and examined. Feels good. No cp. No sob. No palpitations    ROS: All other systems are negative.    Vital Signs:    T(F): 97.5 (21 @ 05:00), Max: 97.5 (21 @ 05:00)  HR: 56 (21 @ 20:39) (54 - 83)  BP: 128/63 (21 @ 05:00) (128/63 - 158/86)  RR: 18 (21 @ 05:00) (18 - 20)  SpO2: 98% (21 @ 14:23) (98% - 98%)  I&O's Summary    Daily Height in cm: 167.64 (06 Aug 2021 18:46)    Daily Weight in k.7 (07 Aug 2021 05:00)  CAPILLARY BLOOD GLUCOSE          PHYSICAL EXAM:    GENERAL:  NAD  SKIN: No rashes or lesions  HENT: Atraumatic. Normocephalic. PERRL. Moist membranes.  NECK: Supple, No JVD. No lymphadenopathy.  PULMONARY: CTA B/L. No wheezing. No rales  CVS: Normal S1, S2. Rate and Rhythm are regular. No murmurs.  ABDOMEN/GI: Soft, Nontender, Nondistended; BS present  EXTREMITIES: Peripheral pulses intact. No edema B/L LE.  NEUROLOGIC:  No motor or sensory deficit.  PSYCH: Alert & oriented x 3    Consultant(s) Notes Reviewed:  [x ] YES  [ ] NO  Care Discussed with Consultants/Other Providers [ x] YES  [ ] NO    EKG reviewed  Telemetry reviewed    LABS:                        14.2   6.68  )-----------( 218      ( 07 Aug 2021 05:27 )             42.0     08-    139  |  102  |  14  ----------------------------<  78  4.3   |  28  |  0.8    Ca    8.9      07 Aug 2021 05:27  Mg     2.2     08-06    TPro  5.8<L>  /  Alb  4.0  /  TBili  1.7<H>  /  DBili  x   /  AST  20  /  ALT  15  /  AlkPhos  45      PT/INR - ( 06 Aug 2021 13:35 )   PT: 11.40 sec;   INR: 0.99 ratio         PTT - ( 06 Aug 2021 13:35 )  PTT:24.0 sec  Serum Pro-Brain Natriuretic Peptide: 228 pg/mL (21 @ 11:00)    Trop 0.02, CKMB --, CK --, 21 @ 11:00        RADIOLOGY & ADDITIONAL TESTS:      Imaging or report Personally Reviewed:  [ ] YES  [ ] NO    Medications:  Standing  amLODIPine   Tablet 5 milliGRAM(s) Oral daily  aspirin  chewable 81 milliGRAM(s) Oral daily  atorvastatin 80 milliGRAM(s) Oral at bedtime  enoxaparin Injectable 40 milliGRAM(s) SubCutaneous daily  lisinopril 10 milliGRAM(s) Oral daily  metoprolol tartrate 12.5 milliGRAM(s) Oral two times a day  pantoprazole    Tablet 40 milliGRAM(s) Oral before breakfast  polyethylene glycol 3350 17 Gram(s) Oral daily  senna 2 Tablet(s) Oral daily  sodium chloride 0.9%. 1000 milliLiter(s) IV Continuous <Continuous>    PRN Meds  acetaminophen   Tablet .. 650 milliGRAM(s) Oral every 6 hours PRN  aluminum hydroxide/magnesium hydroxide/simethicone Suspension 30 milliLiter(s) Oral every 4 hours PRN  melatonin 3 milliGRAM(s) Oral at bedtime PRN  ondansetron Injectable 4 milliGRAM(s) IV Push every 8 hours PRN  oxycodone    5 mG/acetaminophen 325 mG 1 Tablet(s) Oral every 4 hours PRN      Case discussed with resident    Care discussed with pt/family

## 2021-08-07 NOTE — DISCHARGE NOTE PROVIDER - NSDCCPCAREPLAN_GEN_ALL_CORE_FT
PRINCIPAL DISCHARGE DIAGNOSIS  Diagnosis: Chest pain  Assessment and Plan of Treatment: Your chest pain is caused by stenosis in one of your coronary arteries. You need to take medications as prescribed, with a statin, and aspirin, as well as a medication that will will help with chest pain. Please do a follow-up with your physician.

## 2021-08-07 NOTE — DISCHARGE NOTE NURSING/CASE MANAGEMENT/SOCIAL WORK - PATIENT PORTAL LINK FT
You can access the FollowMyHealth Patient Portal offered by [facility] by registering at the following website: [email or web address]. By joining [facility]’s FollowMyHealth portal, you will also be able to view your health information using other applications (apps) compatible with our system.

## 2021-08-07 NOTE — DISCHARGE NOTE PROVIDER - NSDCMRMEDTOKEN_GEN_ALL_CORE_FT
AMLOD/BENAZP CAP 5-1: 1 cap(s) orally once a day  APAP/CODEINE : 1 tab(s) orally 2 times a day, As Needed  aspirin 81 mg oral tablet, chewable: 1 tab(s) orally once a day  atorvastatin 80 mg oral tablet: 1 tab(s) orally once a day (at bedtime)  metoprolol: 12.5 milligram(s) orally 2 times a day   AMLOD/BENAZP CAP 5-1: 1 cap(s) orally once a day  APAP/CODEINE : 1 tab(s) orally 2 times a day, As Needed  aspirin 81 mg oral delayed release tablet: 1 tab(s) orally once a day   aspirin 81 mg oral tablet, chewable: 1 tab(s) orally once a day  atorvastatin 80 mg oral tablet: 1 tab(s) orally once a day (at bedtime)  metoprolol: 12.5 milligram(s) orally 2 times a day  metoprolol tartrate 25 mg oral tablet: 0.5 tab(s) orally 2 times a day

## 2021-08-07 NOTE — DISCHARGE NOTE PROVIDER - CARE PROVIDER_API CALL
Baljeet Bernal  [id number]  [place] [address]  Phone: [phone]  Fax: [phone]  Follow Up Time: Routine

## 2021-08-07 NOTE — PROGRESS NOTE ADULT - ASSESSMENT
CTA, cath ECG, telemetry reviewed    I feel the RCA disease should be continued to be managed medically at this time.  Would avoid PCI, given the need for DAPT post PCI, in light of his upcoming neurosurgery.  Will clear for surgery as intermediate risk, given known CAD with no evidence of ACS.  C/w BB perioperatively.    D/c home today.    F/u with Dr. Ann for Schwannoma resection.

## 2021-08-07 NOTE — DISCHARGE NOTE PROVIDER - HOSPITAL COURSE
76 year old Male with Past Medical History of HTN, DLD, recently diagnosed with cervical schwannoma presents after having a positive CCTA.    Patient with daughter at the bedside reports pressure like intermittent chest pain for the last week, mostly substernal mild to moderate without any significant alleviating or aggravating factors. Patient during pre-op evaluation for a schwannoma removal had a CCTA showing severe RCA stenosis and moderate LCX stenosis. He was referred to hospital for cath. ROS is negative.    Patient was admitted stable, and catheterization showed significant stenosis of his right coronary artery, not manageable with stent placement for now. Conservative medical treatment suffices, and patient is going to be discharged with Atorvastatin 80 mg qdaily, metoprolol 12.5 mg BID and aspirin qdaily.

## 2021-08-07 NOTE — PROGRESS NOTE ADULT - SUBJECTIVE AND OBJECTIVE BOX
Patient is a 76y old  Male who presents with a chief complaint of Admitted for coronary catheterization  (07 Aug 2021 13:10)    HPI:  76 year old Male with Past Medical History of HTN, DLD, recently diagnosed Schwannoma presents after having a positive CCTA.    Patient with daughter at the bedside reports pressure like intermittent chest pain for the last week, mostly substernal mild to moderate without any significant alleviating or aggravating factors. Patient confused it with his back pain which he has with the schwannoma. Patient during pre-op evaluation for a schwannoma removal had a CCTA showing severe RCA stenosis and moderate LCX stenosis. He was referred to hospital for cath. Denies any fever, chest pain, abdominal pain, nausea, vomiting, diarrhea or any other complaints.    In ED patient hemodynamically stable, afebrile, troponin 0.02, Cardiology plan for a cath today. At time of interview only complaint is back pain. (06 Aug 2021 14:00)      SUBJ:  Patient seen and examined. Cath films reviewed. Stable severe diffuse disease of the distal RCA, calcified plaque. Moderate ostial disease. No chest pain at this time. Tolerated b-blocker.      MEDICATIONS  (STANDING):  amLODIPine   Tablet 5 milliGRAM(s) Oral daily  aspirin  chewable 81 milliGRAM(s) Oral daily  atorvastatin 80 milliGRAM(s) Oral at bedtime  enoxaparin Injectable 40 milliGRAM(s) SubCutaneous daily  lisinopril 10 milliGRAM(s) Oral daily  metoprolol tartrate 12.5 milliGRAM(s) Oral two times a day  pantoprazole    Tablet 40 milliGRAM(s) Oral before breakfast  polyethylene glycol 3350 17 Gram(s) Oral daily  senna 2 Tablet(s) Oral daily  sodium chloride 0.9%. 1000 milliLiter(s) (100 mL/Hr) IV Continuous <Continuous>    MEDICATIONS  (PRN):  acetaminophen   Tablet .. 650 milliGRAM(s) Oral every 6 hours PRN Temp greater or equal to 38.5C (101.3F), Mild Pain (1 - 3)  aluminum hydroxide/magnesium hydroxide/simethicone Suspension 30 milliLiter(s) Oral every 4 hours PRN Dyspepsia  melatonin 3 milliGRAM(s) Oral at bedtime PRN Insomnia  ondansetron Injectable 4 milliGRAM(s) IV Push every 8 hours PRN Nausea and/or Vomiting  oxycodone    5 mG/acetaminophen 325 mG 1 Tablet(s) Oral every 4 hours PRN Severe Pain (7 - 10)            Vital Signs Last 24 Hrs  T(C): 36.8 (07 Aug 2021 12:46), Max: 36.8 (07 Aug 2021 12:46)  T(F): 98.3 (07 Aug 2021 12:46), Max: 98.3 (07 Aug 2021 12:46)  HR: 83 (07 Aug 2021 12:46) (54 - 83)  BP: 107/59 (07 Aug 2021 12:46) (107/59 - 158/86)  BP(mean): --  RR: 18 (07 Aug 2021 12:46) (18 - 20)  SpO2: 98% (06 Aug 2021 14:23) (98% - 98%)      PHYSICAL EXAM:    GEN: AAO x 3, NAD  HEENT: NC/AT, PERRL  Neck: No JVD, no bruits  CV: Reg, S1-S2, no murmur  Lungs: CTAB  Abd: Soft, non-tender  Ext: No edema          I&O's Summary  	    TELEMETRY:    ECG:    TTE:      LABS:                        14.2   6.68  )-----------( 218      ( 07 Aug 2021 05:27 )             42.0     08-07    139  |  102  |  14  ----------------------------<  78  4.3   |  28  |  0.8    Ca    8.9      07 Aug 2021 05:27  Mg     2.2     08-06    TPro  5.8<L>  /  Alb  4.0  /  TBili  1.7<H>  /  DBili  x   /  AST  20  /  ALT  15  /  AlkPhos  45  08-07    CARDIAC MARKERS ( 06 Aug 2021 11:00 )  x     / 0.02 ng/mL / x     / x     / x          PT/INR - ( 06 Aug 2021 13:35 )   PT: 11.40 sec;   INR: 0.99 ratio         PTT - ( 06 Aug 2021 13:35 )  PTT:24.0 sec      BNP  RADIOLOGY & ADDITIONAL STUDIES:      IMPRESSION AND PLAN:

## 2021-08-09 ENCOUNTER — INPATIENT (INPATIENT)
Facility: HOSPITAL | Age: 76
LOS: 5 days | Discharge: HOME HEALTH | End: 2021-08-15
Attending: NEUROLOGICAL SURGERY | Admitting: NEUROLOGICAL SURGERY
Payer: MEDICARE

## 2021-08-09 VITALS
WEIGHT: 134.92 LBS | DIASTOLIC BLOOD PRESSURE: 55 MMHG | RESPIRATION RATE: 18 BRPM | HEART RATE: 71 BPM | OXYGEN SATURATION: 99 % | HEIGHT: 66 IN | SYSTOLIC BLOOD PRESSURE: 139 MMHG | TEMPERATURE: 98 F

## 2021-08-09 DIAGNOSIS — E78.5 HYPERLIPIDEMIA, UNSPECIFIED: ICD-10-CM

## 2021-08-09 DIAGNOSIS — Z79.82 LONG TERM (CURRENT) USE OF ASPIRIN: ICD-10-CM

## 2021-08-09 DIAGNOSIS — G95.29 OTHER CORD COMPRESSION: ICD-10-CM

## 2021-08-09 DIAGNOSIS — I25.10 ATHEROSCLEROTIC HEART DISEASE OF NATIVE CORONARY ARTERY WITHOUT ANGINA PECTORIS: ICD-10-CM

## 2021-08-09 DIAGNOSIS — I45.2 BIFASCICULAR BLOCK: ICD-10-CM

## 2021-08-09 DIAGNOSIS — I10 ESSENTIAL (PRIMARY) HYPERTENSION: ICD-10-CM

## 2021-08-09 DIAGNOSIS — D36.17 BENIGN NEOPLASM OF PERIPHERAL NERVES AND AUTONOMIC NERVOUS SYSTEM OF TRUNK, UNSPECIFIED: ICD-10-CM

## 2021-08-09 DIAGNOSIS — D32.1 BENIGN NEOPLASM OF SPINAL MENINGES: ICD-10-CM

## 2021-08-09 LAB
ALBUMIN SERPL ELPH-MCNC: 4.5 G/DL — SIGNIFICANT CHANGE UP (ref 3.5–5.2)
ALBUMIN SERPL ELPH-MCNC: 4.7 G/DL — SIGNIFICANT CHANGE UP (ref 3.5–5.2)
ALP SERPL-CCNC: 51 U/L — SIGNIFICANT CHANGE UP (ref 30–115)
ALP SERPL-CCNC: 52 U/L — SIGNIFICANT CHANGE UP (ref 30–115)
ALT FLD-CCNC: 16 U/L — SIGNIFICANT CHANGE UP (ref 0–41)
ALT FLD-CCNC: 17 U/L — SIGNIFICANT CHANGE UP (ref 0–41)
ANION GAP SERPL CALC-SCNC: 10 MMOL/L — SIGNIFICANT CHANGE UP (ref 7–14)
ANION GAP SERPL CALC-SCNC: 11 MMOL/L — SIGNIFICANT CHANGE UP (ref 7–14)
APTT BLD: 27.9 SEC — SIGNIFICANT CHANGE UP (ref 27–39.2)
APTT BLD: 29.5 SEC — SIGNIFICANT CHANGE UP (ref 27–39.2)
AST SERPL-CCNC: 23 U/L — SIGNIFICANT CHANGE UP (ref 0–41)
AST SERPL-CCNC: 25 U/L — SIGNIFICANT CHANGE UP (ref 0–41)
BASOPHILS # BLD AUTO: 0.03 K/UL — SIGNIFICANT CHANGE UP (ref 0–0.2)
BASOPHILS NFR BLD AUTO: 0.4 % — SIGNIFICANT CHANGE UP (ref 0–1)
BILIRUB SERPL-MCNC: 1.4 MG/DL — HIGH (ref 0.2–1.2)
BILIRUB SERPL-MCNC: 1.4 MG/DL — HIGH (ref 0.2–1.2)
BLD GP AB SCN SERPL QL: SIGNIFICANT CHANGE UP
BUN SERPL-MCNC: 21 MG/DL — HIGH (ref 10–20)
BUN SERPL-MCNC: 21 MG/DL — HIGH (ref 10–20)
CALCIUM SERPL-MCNC: 9.1 MG/DL — SIGNIFICANT CHANGE UP (ref 8.5–10.1)
CALCIUM SERPL-MCNC: 9.1 MG/DL — SIGNIFICANT CHANGE UP (ref 8.5–10.1)
CHLORIDE SERPL-SCNC: 101 MMOL/L — SIGNIFICANT CHANGE UP (ref 98–110)
CHLORIDE SERPL-SCNC: 101 MMOL/L — SIGNIFICANT CHANGE UP (ref 98–110)
CO2 SERPL-SCNC: 27 MMOL/L — SIGNIFICANT CHANGE UP (ref 17–32)
CO2 SERPL-SCNC: 27 MMOL/L — SIGNIFICANT CHANGE UP (ref 17–32)
CREAT SERPL-MCNC: 0.8 MG/DL — SIGNIFICANT CHANGE UP (ref 0.7–1.5)
CREAT SERPL-MCNC: 0.9 MG/DL — SIGNIFICANT CHANGE UP (ref 0.7–1.5)
EOSINOPHIL # BLD AUTO: 0.05 K/UL — SIGNIFICANT CHANGE UP (ref 0–0.7)
EOSINOPHIL NFR BLD AUTO: 0.7 % — SIGNIFICANT CHANGE UP (ref 0–8)
GLUCOSE SERPL-MCNC: 92 MG/DL — SIGNIFICANT CHANGE UP (ref 70–99)
GLUCOSE SERPL-MCNC: 97 MG/DL — SIGNIFICANT CHANGE UP (ref 70–99)
HCT VFR BLD CALC: 41.2 % — LOW (ref 42–52)
HGB BLD-MCNC: 13.8 G/DL — LOW (ref 14–18)
IMM GRANULOCYTES NFR BLD AUTO: 0.3 % — SIGNIFICANT CHANGE UP (ref 0.1–0.3)
INR BLD: 1 RATIO — SIGNIFICANT CHANGE UP (ref 0.65–1.3)
INR BLD: 1.01 RATIO — SIGNIFICANT CHANGE UP (ref 0.65–1.3)
LYMPHOCYTES # BLD AUTO: 1.59 K/UL — SIGNIFICANT CHANGE UP (ref 1.2–3.4)
LYMPHOCYTES # BLD AUTO: 21.6 % — SIGNIFICANT CHANGE UP (ref 20.5–51.1)
MCHC RBC-ENTMCNC: 32.3 PG — HIGH (ref 27–31)
MCHC RBC-ENTMCNC: 33.5 G/DL — SIGNIFICANT CHANGE UP (ref 32–37)
MCV RBC AUTO: 96.5 FL — HIGH (ref 80–94)
MONOCYTES # BLD AUTO: 0.72 K/UL — HIGH (ref 0.1–0.6)
MONOCYTES NFR BLD AUTO: 9.8 % — HIGH (ref 1.7–9.3)
NEUTROPHILS # BLD AUTO: 4.95 K/UL — SIGNIFICANT CHANGE UP (ref 1.4–6.5)
NEUTROPHILS NFR BLD AUTO: 67.2 % — SIGNIFICANT CHANGE UP (ref 42.2–75.2)
NRBC # BLD: 0 /100 WBCS — SIGNIFICANT CHANGE UP (ref 0–0)
PLATELET # BLD AUTO: 207 K/UL — SIGNIFICANT CHANGE UP (ref 130–400)
POTASSIUM SERPL-MCNC: 4.9 MMOL/L — SIGNIFICANT CHANGE UP (ref 3.5–5)
POTASSIUM SERPL-MCNC: 5.1 MMOL/L — HIGH (ref 3.5–5)
POTASSIUM SERPL-SCNC: 4.9 MMOL/L — SIGNIFICANT CHANGE UP (ref 3.5–5)
POTASSIUM SERPL-SCNC: 5.1 MMOL/L — HIGH (ref 3.5–5)
PROT SERPL-MCNC: 6.5 G/DL — SIGNIFICANT CHANGE UP (ref 6–8)
PROT SERPL-MCNC: 6.6 G/DL — SIGNIFICANT CHANGE UP (ref 6–8)
PROTHROM AB SERPL-ACNC: 11.5 SEC — SIGNIFICANT CHANGE UP (ref 9.95–12.87)
PROTHROM AB SERPL-ACNC: 11.6 SEC — SIGNIFICANT CHANGE UP (ref 9.95–12.87)
RBC # BLD: 4.27 M/UL — LOW (ref 4.7–6.1)
RBC # FLD: 12.3 % — SIGNIFICANT CHANGE UP (ref 11.5–14.5)
SARS-COV-2 RNA SPEC QL NAA+PROBE: SIGNIFICANT CHANGE UP
SODIUM SERPL-SCNC: 138 MMOL/L — SIGNIFICANT CHANGE UP (ref 135–146)
SODIUM SERPL-SCNC: 139 MMOL/L — SIGNIFICANT CHANGE UP (ref 135–146)
WBC # BLD: 7.36 K/UL — SIGNIFICANT CHANGE UP (ref 4.8–10.8)
WBC # FLD AUTO: 7.36 K/UL — SIGNIFICANT CHANGE UP (ref 4.8–10.8)

## 2021-08-09 PROCEDURE — 72157 MRI CHEST SPINE W/O & W/DYE: CPT | Mod: 26

## 2021-08-09 PROCEDURE — 99285 EMERGENCY DEPT VISIT HI MDM: CPT

## 2021-08-09 PROCEDURE — 93010 ELECTROCARDIOGRAM REPORT: CPT

## 2021-08-09 RX ORDER — DEXAMETHASONE 0.5 MG/5ML
4 ELIXIR ORAL EVERY 6 HOURS
Refills: 0 | Status: DISCONTINUED | OUTPATIENT
Start: 2021-08-09 | End: 2021-08-10

## 2021-08-09 RX ORDER — OXYCODONE AND ACETAMINOPHEN 5; 325 MG/1; MG/1
2 TABLET ORAL EVERY 4 HOURS
Refills: 0 | Status: DISCONTINUED | OUTPATIENT
Start: 2021-08-09 | End: 2021-08-10

## 2021-08-09 RX ORDER — METOPROLOL TARTRATE 50 MG
12.5 TABLET ORAL EVERY 12 HOURS
Refills: 0 | Status: DISCONTINUED | OUTPATIENT
Start: 2021-08-09 | End: 2021-08-10

## 2021-08-09 RX ORDER — SENNA PLUS 8.6 MG/1
2 TABLET ORAL AT BEDTIME
Refills: 0 | Status: DISCONTINUED | OUTPATIENT
Start: 2021-08-09 | End: 2021-08-10

## 2021-08-09 RX ORDER — OXYCODONE AND ACETAMINOPHEN 5; 325 MG/1; MG/1
1 TABLET ORAL EVERY 6 HOURS
Refills: 0 | Status: DISCONTINUED | OUTPATIENT
Start: 2021-08-09 | End: 2021-08-10

## 2021-08-09 RX ORDER — ATORVASTATIN CALCIUM 80 MG/1
80 TABLET, FILM COATED ORAL AT BEDTIME
Refills: 0 | Status: DISCONTINUED | OUTPATIENT
Start: 2021-08-09 | End: 2021-08-10

## 2021-08-09 RX ORDER — METHOCARBAMOL 500 MG/1
750 TABLET, FILM COATED ORAL EVERY 6 HOURS
Refills: 0 | Status: DISCONTINUED | OUTPATIENT
Start: 2021-08-09 | End: 2021-08-10

## 2021-08-09 RX ORDER — PANTOPRAZOLE SODIUM 20 MG/1
40 TABLET, DELAYED RELEASE ORAL
Refills: 0 | Status: DISCONTINUED | OUTPATIENT
Start: 2021-08-09 | End: 2021-08-10

## 2021-08-09 RX ORDER — MORPHINE SULFATE 50 MG/1
2 CAPSULE, EXTENDED RELEASE ORAL EVERY 4 HOURS
Refills: 0 | Status: DISCONTINUED | OUTPATIENT
Start: 2021-08-09 | End: 2021-08-10

## 2021-08-09 RX ORDER — DEXAMETHASONE 0.5 MG/5ML
10 ELIXIR ORAL ONCE
Refills: 0 | Status: COMPLETED | OUTPATIENT
Start: 2021-08-09 | End: 2021-08-09

## 2021-08-09 RX ADMIN — Medication 102 MILLIGRAM(S): at 19:05

## 2021-08-09 RX ADMIN — OXYCODONE AND ACETAMINOPHEN 1 TABLET(S): 5; 325 TABLET ORAL at 19:43

## 2021-08-09 RX ADMIN — Medication 12.5 MILLIGRAM(S): at 22:06

## 2021-08-09 RX ADMIN — OXYCODONE AND ACETAMINOPHEN 1 TABLET(S): 5; 325 TABLET ORAL at 20:15

## 2021-08-09 RX ADMIN — ATORVASTATIN CALCIUM 80 MILLIGRAM(S): 80 TABLET, FILM COATED ORAL at 21:43

## 2021-08-09 NOTE — ED PROVIDER NOTE - ATTENDING CONTRIBUTION TO CARE
76M PMH CAD HTN HL, T11 schwannoma, sent by Dr Ann for admission for surgery. NS at bedside, requesting preop labs, ekg, swab, admit to Dr Ann. pt c/o mid back pain. sharp constant radaites to front. no numbness, weakness. no bowel/bladder incontinence/retention. no cp, sob. no fever, cough. no abd pain, nvdc. no dysuria, freq, hematuria.     on exam, AFVSS, well trixie nad, ncat, eomi, perrla, mmm, lctab, rrr nl s1s2 no mrg, abd soft ntnd, aaox3, CN 2-12 intact, No nystagmus.  5/5 motor x 4 ext, SILT x 4 extremities, No facial droop or slurred speech. No midline C/T/L tenderness to palpation or step off. Normal gait, No ataxia.  , no le edema or calf ttp,     a/p; BP 2/2 Schwannoma, labs, ekg, swab, ad suha to Dr Ann/Neurosurgery service

## 2021-08-09 NOTE — ED ADULT TRIAGE NOTE - CHIEF COMPLAINT QUOTE
Patient has hx of spinal stenosis and chronic back pain has worsened today making it difficult to Ambulate sent in by Neuro surgery for further eval

## 2021-08-09 NOTE — ED PROVIDER NOTE - NS ED ROS FT

## 2021-08-09 NOTE — H&P ADULT - ASSESSMENT
76 year-old male presenting for admission for T11-T12 schwannoma resection.  -Pain Management  -MRI T spine +/- if no contraindications  -Decadron 10 mg now then dec 4mg q 6hrs  -CBC, CMP, T&S, coags  -COVID swab  -EKG  -NPO after midnight  -DVT ppx

## 2021-08-09 NOTE — H&P ADULT - HISTORY OF PRESENT ILLNESS
Patient is a 76 year-old male PMH HTN and HLD on ASA-81 who is presenting for admission for T11-T12 schwannoma resection. Patient claims he has been having severe lower back pain that radiates around to his anterior abdomen since about March. Patient claims pain is worse when sitting and even worse when lying down. He was seen and examined at bedside in ED. Pt standing up for most of examination secondary to LBP while sitting. He is following all commands and endorses LBP with radiation around abdomen. Denies headaches, weakness, paresthesias, saddle anesthesia, incontinence, chest pain, or SOB.

## 2021-08-09 NOTE — ED PROVIDER NOTE - PHYSICAL EXAMINATION
CONSTITUTIONAL: well-appearing, in NAD  SKIN: Warm dry, normal skin turgor  HEAD: NCAT  EYES: EOMI, PERRLA, no scleral icterus, conjunctiva pink  ENT: normal pharynx with no erythema or exudates  NECK: Supple; non tender. Full ROM; no c/t/l/s tenderness  CARD: RRR, no murmurs.  RESP: clear to ausculation b/l. No crackles or wheezing.  ABD: soft, non-tender, non-distended, no rebound or guarding.  EXT: Full ROM, no bony tenderness, no pedal edema, no calf tenderness  NEURO: normal motor. normal sensory. Normal gait.  PSYCH: Cooperative, appropriate.

## 2021-08-09 NOTE — H&P ADULT - NSHPPHYSICALEXAM_GEN_ALL_CORE
Physical Exam:  General: Sitting upright, following all commands, NAD  AAOX3. Verbal function intact  Facial motions symmetric  EOMI  Motor: MAEx4  5/5 power in b/l UE's and LE's  Hoffmans: Negative  Reflexes: Patellar reflexes 2+ b/l and symmetric  Sensation: intact to touch in all extremities

## 2021-08-09 NOTE — ED PROVIDER NOTE - OBJECTIVE STATEMENT
76 year old male with PMH of HTN and HLD on ASA-81 presenting with lower lumbar back pain for 4 months, which got acutely worse over the past few days. Patient was found to have schwannoma, follows Dr. Ann for neurosurgery, and was told to come in for pre-op testing and admission for T11-T12 schwannoma resection. Patient claims pain is worse when sitting and even worse when lying down. Denies fever, IVDA, headaches, weakness, paresthesias, saddle anesthesia, incontinence, chest pain, or SOB.

## 2021-08-10 ENCOUNTER — RESULT REVIEW (OUTPATIENT)
Age: 76
End: 2021-08-10

## 2021-08-10 LAB
ANION GAP SERPL CALC-SCNC: 9 MMOL/L — SIGNIFICANT CHANGE UP (ref 7–14)
APTT BLD: 21.9 SEC — CRITICAL LOW (ref 27–39.2)
BUN SERPL-MCNC: 17 MG/DL — SIGNIFICANT CHANGE UP (ref 10–20)
CALCIUM SERPL-MCNC: 8 MG/DL — LOW (ref 8.5–10.1)
CHLORIDE SERPL-SCNC: 106 MMOL/L — SIGNIFICANT CHANGE UP (ref 98–110)
CO2 SERPL-SCNC: 21 MMOL/L — SIGNIFICANT CHANGE UP (ref 17–32)
COVID-19 SPIKE DOMAIN AB INTERP: POSITIVE
COVID-19 SPIKE DOMAIN ANTIBODY RESULT: >250 U/ML — HIGH
CREAT SERPL-MCNC: 0.6 MG/DL — LOW (ref 0.7–1.5)
GLUCOSE SERPL-MCNC: 134 MG/DL — HIGH (ref 70–99)
HCT VFR BLD CALC: 34.7 % — LOW (ref 42–52)
HGB BLD-MCNC: 12.2 G/DL — LOW (ref 14–18)
INR BLD: 1.18 RATIO — SIGNIFICANT CHANGE UP (ref 0.65–1.3)
MCHC RBC-ENTMCNC: 32.5 PG — HIGH (ref 27–31)
MCHC RBC-ENTMCNC: 35.2 G/DL — SIGNIFICANT CHANGE UP (ref 32–37)
MCV RBC AUTO: 92.5 FL — SIGNIFICANT CHANGE UP (ref 80–94)
NRBC # BLD: 0 /100 WBCS — SIGNIFICANT CHANGE UP (ref 0–0)
PLATELET # BLD AUTO: 163 K/UL — SIGNIFICANT CHANGE UP (ref 130–400)
POTASSIUM SERPL-MCNC: 4 MMOL/L — SIGNIFICANT CHANGE UP (ref 3.5–5)
POTASSIUM SERPL-SCNC: 4 MMOL/L — SIGNIFICANT CHANGE UP (ref 3.5–5)
PROTHROM AB SERPL-ACNC: 13.5 SEC — HIGH (ref 9.95–12.87)
RBC # BLD: 3.75 M/UL — LOW (ref 4.7–6.1)
RBC # FLD: 11.7 % — SIGNIFICANT CHANGE UP (ref 11.5–14.5)
SARS-COV-2 IGG+IGM SERPL QL IA: >250 U/ML — HIGH
SARS-COV-2 IGG+IGM SERPL QL IA: POSITIVE
SODIUM SERPL-SCNC: 136 MMOL/L — SIGNIFICANT CHANGE UP (ref 135–146)
WBC # BLD: 11.26 K/UL — HIGH (ref 4.8–10.8)
WBC # FLD AUTO: 11.26 K/UL — HIGH (ref 4.8–10.8)

## 2021-08-10 PROCEDURE — 63281 BX/EXC IDRL SPINE LESN THRC: CPT | Mod: 62

## 2021-08-10 PROCEDURE — 61783 SCAN PROC SPINAL: CPT | Mod: AS

## 2021-08-10 PROCEDURE — 71045 X-RAY EXAM CHEST 1 VIEW: CPT | Mod: 26

## 2021-08-10 PROCEDURE — 22840 INSERT SPINE FIXATION DEVICE: CPT | Mod: 62

## 2021-08-10 PROCEDURE — 88305 TISSUE EXAM BY PATHOLOGIST: CPT | Mod: 26

## 2021-08-10 PROCEDURE — 99291 CRITICAL CARE FIRST HOUR: CPT

## 2021-08-10 PROCEDURE — 22840 INSERT SPINE FIXATION DEVICE: CPT | Mod: AS

## 2021-08-10 PROCEDURE — 22610 ARTHRD PST TQ 1NTRSPC THRC: CPT | Mod: AS

## 2021-08-10 PROCEDURE — 61783 SCAN PROC SPINAL: CPT

## 2021-08-10 PROCEDURE — 63281 BX/EXC IDRL SPINE LESN THRC: CPT | Mod: AS

## 2021-08-10 PROCEDURE — 93010 ELECTROCARDIOGRAM REPORT: CPT

## 2021-08-10 PROCEDURE — 22610 ARTHRD PST TQ 1NTRSPC THRC: CPT | Mod: 62

## 2021-08-10 PROCEDURE — 88307 TISSUE EXAM BY PATHOLOGIST: CPT | Mod: 26

## 2021-08-10 RX ORDER — CEFAZOLIN SODIUM 1 G
1000 VIAL (EA) INJECTION EVERY 8 HOURS
Refills: 0 | Status: COMPLETED | OUTPATIENT
Start: 2021-08-10 | End: 2021-08-11

## 2021-08-10 RX ORDER — ONDANSETRON 8 MG/1
4 TABLET, FILM COATED ORAL EVERY 6 HOURS
Refills: 0 | Status: DISCONTINUED | OUTPATIENT
Start: 2021-08-10 | End: 2021-08-15

## 2021-08-10 RX ORDER — OXYCODONE AND ACETAMINOPHEN 5; 325 MG/1; MG/1
2 TABLET ORAL EVERY 4 HOURS
Refills: 0 | Status: DISCONTINUED | OUTPATIENT
Start: 2021-08-10 | End: 2021-08-10

## 2021-08-10 RX ORDER — DEXAMETHASONE 0.5 MG/5ML
4 ELIXIR ORAL EVERY 6 HOURS
Refills: 0 | Status: DISCONTINUED | OUTPATIENT
Start: 2021-08-10 | End: 2021-08-11

## 2021-08-10 RX ORDER — CHLORHEXIDINE GLUCONATE 213 G/1000ML
1 SOLUTION TOPICAL
Refills: 0 | Status: DISCONTINUED | OUTPATIENT
Start: 2021-08-10 | End: 2021-08-15

## 2021-08-10 RX ORDER — SENNA PLUS 8.6 MG/1
2 TABLET ORAL AT BEDTIME
Refills: 0 | Status: DISCONTINUED | OUTPATIENT
Start: 2021-08-10 | End: 2021-08-15

## 2021-08-10 RX ORDER — ATORVASTATIN CALCIUM 80 MG/1
80 TABLET, FILM COATED ORAL AT BEDTIME
Refills: 0 | Status: DISCONTINUED | OUTPATIENT
Start: 2021-08-10 | End: 2021-08-15

## 2021-08-10 RX ORDER — HYDROMORPHONE HYDROCHLORIDE 2 MG/ML
30 INJECTION INTRAMUSCULAR; INTRAVENOUS; SUBCUTANEOUS
Refills: 0 | Status: DISCONTINUED | OUTPATIENT
Start: 2021-08-10 | End: 2021-08-11

## 2021-08-10 RX ORDER — ACETAMINOPHEN 500 MG
975 TABLET ORAL EVERY 4 HOURS
Refills: 0 | Status: DISCONTINUED | OUTPATIENT
Start: 2021-08-10 | End: 2021-08-11

## 2021-08-10 RX ORDER — PANTOPRAZOLE SODIUM 20 MG/1
40 TABLET, DELAYED RELEASE ORAL
Refills: 0 | Status: DISCONTINUED | OUTPATIENT
Start: 2021-08-10 | End: 2021-08-11

## 2021-08-10 RX ORDER — MORPHINE SULFATE 50 MG/1
2 CAPSULE, EXTENDED RELEASE ORAL EVERY 4 HOURS
Refills: 0 | Status: DISCONTINUED | OUTPATIENT
Start: 2021-08-10 | End: 2021-08-10

## 2021-08-10 RX ORDER — ACETAMINOPHEN 500 MG
650 TABLET ORAL EVERY 6 HOURS
Refills: 0 | Status: DISCONTINUED | OUTPATIENT
Start: 2021-08-10 | End: 2021-08-10

## 2021-08-10 RX ORDER — OXYCODONE AND ACETAMINOPHEN 5; 325 MG/1; MG/1
1 TABLET ORAL EVERY 6 HOURS
Refills: 0 | Status: DISCONTINUED | OUTPATIENT
Start: 2021-08-10 | End: 2021-08-10

## 2021-08-10 RX ORDER — SODIUM CHLORIDE 9 MG/ML
1000 INJECTION INTRAMUSCULAR; INTRAVENOUS; SUBCUTANEOUS
Refills: 0 | Status: DISCONTINUED | OUTPATIENT
Start: 2021-08-10 | End: 2021-08-11

## 2021-08-10 RX ORDER — SODIUM CHLORIDE 9 MG/ML
1000 INJECTION, SOLUTION INTRAVENOUS
Refills: 0 | Status: DISCONTINUED | OUTPATIENT
Start: 2021-08-10 | End: 2021-08-10

## 2021-08-10 RX ORDER — METOPROLOL TARTRATE 50 MG
12.5 TABLET ORAL EVERY 12 HOURS
Refills: 0 | Status: DISCONTINUED | OUTPATIENT
Start: 2021-08-10 | End: 2021-08-10

## 2021-08-10 RX ORDER — METHOCARBAMOL 500 MG/1
750 TABLET, FILM COATED ORAL EVERY 6 HOURS
Refills: 0 | Status: DISCONTINUED | OUTPATIENT
Start: 2021-08-10 | End: 2021-08-15

## 2021-08-10 RX ORDER — SODIUM CHLORIDE 9 MG/ML
1000 INJECTION INTRAMUSCULAR; INTRAVENOUS; SUBCUTANEOUS
Refills: 0 | Status: DISCONTINUED | OUTPATIENT
Start: 2021-08-10 | End: 2021-08-10

## 2021-08-10 RX ADMIN — METHOCARBAMOL 750 MILLIGRAM(S): 500 TABLET, FILM COATED ORAL at 05:49

## 2021-08-10 RX ADMIN — HYDROMORPHONE HYDROCHLORIDE 30 MILLILITER(S): 2 INJECTION INTRAMUSCULAR; INTRAVENOUS; SUBCUTANEOUS at 17:00

## 2021-08-10 RX ADMIN — SODIUM CHLORIDE 70 MILLILITER(S): 9 INJECTION INTRAMUSCULAR; INTRAVENOUS; SUBCUTANEOUS at 15:54

## 2021-08-10 RX ADMIN — ATORVASTATIN CALCIUM 80 MILLIGRAM(S): 80 TABLET, FILM COATED ORAL at 22:14

## 2021-08-10 RX ADMIN — Medication 975 MILLIGRAM(S): at 22:13

## 2021-08-10 RX ADMIN — PANTOPRAZOLE SODIUM 40 MILLIGRAM(S): 20 TABLET, DELAYED RELEASE ORAL at 05:50

## 2021-08-10 RX ADMIN — Medication 4 MILLIGRAM(S): at 01:00

## 2021-08-10 RX ADMIN — Medication 975 MILLIGRAM(S): at 22:43

## 2021-08-10 RX ADMIN — Medication 4 MILLIGRAM(S): at 05:50

## 2021-08-10 RX ADMIN — Medication 4 MILLIGRAM(S): at 20:31

## 2021-08-10 RX ADMIN — Medication 100 MILLIGRAM(S): at 22:13

## 2021-08-10 RX ADMIN — METHOCARBAMOL 750 MILLIGRAM(S): 500 TABLET, FILM COATED ORAL at 01:00

## 2021-08-10 NOTE — PROGRESS NOTE ADULT - ASSESSMENT
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   Plan:    Neurological:  - Neuro Checks Q4H  - Activate a CODE Stroke with change in Neuro exam  - Continue Statin  - Dexamethasone 4 mg Q6  - restart ASA 24 hours post op  - Pain control with PCA Dilaudid : 0.2 mg Q6min max dose 4 mg/hr  - Tylenol 975 mg PO Q4H x 24 hours  - Post op labs: BMP, CBC, Troponin, CK  - Mg > 2, K > 4    Cardiovascular:  - SBP goal: MAP > 70  - EKG  - Continue LR @ 75 cc/hr  - Normovolemia  - Normothermia   - Patient with significant bifascicular HB- Please keep Zoll at bedside  - HOLD Beta blockers and outpatient AntiHTN    Pulmonary:  - HOB 45  - Incentive spirometry     GI/:  - Diet: Regular, can restart now  - Bowel Regimen: Senna/Colace  - Strict Is/Os  - D/C Ibarra catheter    Electrolytes:  - Replete as needed.  - Mg > 2  - Normoglycemia  - Normonatremia  - F/u post op labs    ID:  - Ancef 1 g Q8    Hematology:  - SCDs    FLUIDS/ELECTROLYTES/NUTRITION  -IVF: LR  -Monitor, Replete to K>4 and Mg>2  -Diet: Reg  PROPHYLAXIS  -DVT: SCD  -GI:    Code Status: Full    Dispo: ICU    Discussed with Attending Physician: Schwab -[facility]uda [facility]urndorfer 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   Plan:    Neurological:  - Neuro Checks Q4H  - Activate a CODE Stroke with change in Neuro exam  - Continue Statin  - Dexamethasone 4 mg Q6  - restart ASA 24 hours post op  - Pain control with PCA Dilaudid : 0.2 mg Q6min max dose 4 mg/hr  - Tylenol 975 mg PO Q4H x 24 hours  - Post op labs: BMP, CBC, Troponin, CK  - Mg > 2, K > 4    Cardiovascular:  - MAP > 70 per cards input  - EKG, cardiac enzymes  - Normovolemia  - Normothermia   - Patient with significant bifascicular block in with significant coronary disease on recent [facility]- pt with potential risk to develop advanced heart block- A line in place and  pads at bedside  - HOLD Beta blockers and outpatient AntiHTN   - If clinically well, consider ASA POD1 per d/w NSGY    Pulmonary:  - HOB 45  - Incentive spirometry     GI/:  - Diet: Regular, can restart now  - Bowel Regimen: Senna/Colace  - Strict Is/Os  - D/C Ibarra catheter    Electrolytes:  - Replete as needed.  - Mg > 2  - Normoglycemia  - Normonatremia  - F/u post op labs    ID:  - Ancef 1 g Q8    Hematology:  - SCDs    FLUIDS/ELECTROLYTES/NUTRITION  -IVF: LR  -Monitor, Replete to K>4 and Mg>2  -Diet: Reg  PROPHYLAXIS  -DVT: SCD  -GI:    Code Status: Full    Dispo: ICU    Discussed with Attending Physician: Schwab -Juda Zurndorfer [facility]  #[id number]

## 2021-08-10 NOTE — PROGRESS NOTE ADULT - ASSESSMENT
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

## 2021-08-10 NOTE — CONSULT NOTE ADULT - ASSESSMENT
IMPRESSION: Rehab of gait dysfunction / s/p resection of thoracic schwannoma     PRECAUTIONS: [   ] Cardiac  [   ] Respiratory  [   ] Seizures [   ] Contact Isolation  [   ] Droplet Isolation  [   ] Other    Weight Bearing Status:     RECOMMENDATION:    Out of Bed to Chair     DVT/Decubiti Prophylaxis    REHAB PLAN:     [  x  ] Bedside P/T 3-5 times a week   [    ]   Bedside O/T  2-3 times a week             [    ] Speech Therapy               [    ]  No Rehab Therapy Indicated   Conditioning/ROM                                    ADL  Bed Mobility                                               Conditioning/ROM  Transfers                                                     Bed Mobility  Sitting /Standing Balance                         Transfers                                        Gait Training                                               Sitting/Standing Balance  Stair Training [   ]Applicable                    Home equipment Eval                                                                        Splinting  [   ] Only      GOALS:   ADL   [    ]   Independent                    Transfers  [  x  ] Independent                          Ambulation  [  x  ] Independent     [   x  ] With device                            [    ]  CG                                                         [    ]  CG                                                                  [    ] CG                            [    ] Min A                                                   [    ] Min A                                                              [    ] Min  A          DISCHARGE PLAN:   [    ]  Good candidate for Intensive Rehabilitation/Hospital based                                             Will tolerate 3hrs Intensive Rehab Daily                                       [   x  ]  Short Term Rehab in Skilled Nursing Facility                           vs            [ x    ]  Home with Outpatient or  services                                         [     ]  Possible Candidate for Intensive Hospital based Rehab

## 2021-08-10 NOTE — BRIEF OPERATIVE NOTE - NSICDXBRIEFPROCEDURE_GEN_ALL_CORE_FT
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

## 2021-08-10 NOTE — BRIEF OPERATIVE NOTE - COMMENTS
Dr. Liu served as cosurgeon.  Exposure, T11-12 pedicle screws, laminectomy, intradural tumor resection, closure

## 2021-08-11 LAB
ALBUMIN SERPL ELPH-MCNC: 1.7 G/DL — LOW (ref 3.5–5.2)
ALBUMIN SERPL ELPH-MCNC: 3.1 G/DL — LOW (ref 3.5–5.2)
ALBUMIN SERPL ELPH-MCNC: 3.4 G/DL — LOW (ref 3.5–5.2)
ALP SERPL-CCNC: 23 U/L — LOW (ref 30–115)
ALP SERPL-CCNC: 42 U/L — SIGNIFICANT CHANGE UP (ref 30–115)
ALP SERPL-CCNC: 46 U/L — SIGNIFICANT CHANGE UP (ref 30–115)
ALT FLD-CCNC: 12 U/L — SIGNIFICANT CHANGE UP (ref 0–41)
ALT FLD-CCNC: 13 U/L — SIGNIFICANT CHANGE UP (ref 0–41)
ALT FLD-CCNC: 6 U/L — SIGNIFICANT CHANGE UP (ref 0–41)
ANION GAP SERPL CALC-SCNC: 5 MMOL/L — LOW (ref 7–14)
ANION GAP SERPL CALC-SCNC: 7 MMOL/L — SIGNIFICANT CHANGE UP (ref 7–14)
ANION GAP SERPL CALC-SCNC: 8 MMOL/L — SIGNIFICANT CHANGE UP (ref 7–14)
AST SERPL-CCNC: 11 U/L — SIGNIFICANT CHANGE UP (ref 0–41)
AST SERPL-CCNC: 19 U/L — SIGNIFICANT CHANGE UP (ref 0–41)
AST SERPL-CCNC: 20 U/L — SIGNIFICANT CHANGE UP (ref 0–41)
BILIRUB SERPL-MCNC: 0.3 MG/DL — SIGNIFICANT CHANGE UP (ref 0.2–1.2)
BILIRUB SERPL-MCNC: 0.6 MG/DL — SIGNIFICANT CHANGE UP (ref 0.2–1.2)
BILIRUB SERPL-MCNC: 1.2 MG/DL — SIGNIFICANT CHANGE UP (ref 0.2–1.2)
BUN SERPL-MCNC: 14 MG/DL — SIGNIFICANT CHANGE UP (ref 10–20)
BUN SERPL-MCNC: 18 MG/DL — SIGNIFICANT CHANGE UP (ref 10–20)
BUN SERPL-MCNC: 22 MG/DL — HIGH (ref 10–20)
CALCIUM SERPL-MCNC: 4.1 MG/DL — CRITICAL LOW (ref 8.5–10.1)
CALCIUM SERPL-MCNC: 7.9 MG/DL — LOW (ref 8.5–10.1)
CALCIUM SERPL-MCNC: 8.2 MG/DL — LOW (ref 8.5–10.1)
CHLORIDE SERPL-SCNC: 106 MMOL/L — SIGNIFICANT CHANGE UP (ref 98–110)
CHLORIDE SERPL-SCNC: 107 MMOL/L — SIGNIFICANT CHANGE UP (ref 98–110)
CHLORIDE SERPL-SCNC: 123 MMOL/L — HIGH (ref 98–110)
CK MB CFR SERPL CALC: 10.6 NG/ML — HIGH (ref 0.6–6.3)
CK MB CFR SERPL CALC: 13.5 NG/ML — HIGH (ref 0.6–6.3)
CK SERPL-CCNC: 99 U/L — SIGNIFICANT CHANGE UP (ref 0–225)
CO2 SERPL-SCNC: 14 MMOL/L — LOW (ref 17–32)
CO2 SERPL-SCNC: 23 MMOL/L — SIGNIFICANT CHANGE UP (ref 17–32)
CO2 SERPL-SCNC: 26 MMOL/L — SIGNIFICANT CHANGE UP (ref 17–32)
CREAT SERPL-MCNC: 0.6 MG/DL — LOW (ref 0.7–1.5)
CREAT SERPL-MCNC: 0.7 MG/DL — SIGNIFICANT CHANGE UP (ref 0.7–1.5)
CREAT SERPL-MCNC: <0.5 MG/DL — LOW (ref 0.7–1.5)
GAS PNL BLDA: SIGNIFICANT CHANGE UP
GLUCOSE BLDC GLUCOMTR-MCNC: 114 MG/DL — HIGH (ref 70–99)
GLUCOSE SERPL-MCNC: 113 MG/DL — HIGH (ref 70–99)
GLUCOSE SERPL-MCNC: 130 MG/DL — HIGH (ref 70–99)
GLUCOSE SERPL-MCNC: 84 MG/DL — SIGNIFICANT CHANGE UP (ref 70–99)
HCT VFR BLD CALC: 20.7 % — LOW (ref 42–52)
HCT VFR BLD CALC: 34.3 % — LOW (ref 42–52)
HCT VFR BLD CALC: 35.2 % — LOW (ref 42–52)
HGB BLD-MCNC: 11.9 G/DL — LOW (ref 14–18)
HGB BLD-MCNC: 12.4 G/DL — LOW (ref 14–18)
HGB BLD-MCNC: 6.8 G/DL — CRITICAL LOW (ref 14–18)
MAGNESIUM SERPL-MCNC: 1.9 MG/DL — SIGNIFICANT CHANGE UP (ref 1.8–2.4)
MCHC RBC-ENTMCNC: 32.5 PG — HIGH (ref 27–31)
MCHC RBC-ENTMCNC: 32.6 PG — HIGH (ref 27–31)
MCHC RBC-ENTMCNC: 32.9 G/DL — SIGNIFICANT CHANGE UP (ref 32–37)
MCHC RBC-ENTMCNC: 32.9 PG — HIGH (ref 27–31)
MCHC RBC-ENTMCNC: 34.7 G/DL — SIGNIFICANT CHANGE UP (ref 32–37)
MCHC RBC-ENTMCNC: 35.2 G/DL — SIGNIFICANT CHANGE UP (ref 32–37)
MCV RBC AUTO: 93.4 FL — SIGNIFICANT CHANGE UP (ref 80–94)
MCV RBC AUTO: 94 FL — SIGNIFICANT CHANGE UP (ref 80–94)
MCV RBC AUTO: 99 FL — HIGH (ref 80–94)
NRBC # BLD: 0 /100 WBCS — SIGNIFICANT CHANGE UP (ref 0–0)
PHOSPHATE SERPL-MCNC: 4.8 MG/DL — SIGNIFICANT CHANGE UP (ref 2.1–4.9)
PLATELET # BLD AUTO: 180 K/UL — SIGNIFICANT CHANGE UP (ref 130–400)
PLATELET # BLD AUTO: 186 K/UL — SIGNIFICANT CHANGE UP (ref 130–400)
PLATELET # BLD AUTO: 77 K/UL — LOW (ref 130–400)
POTASSIUM SERPL-MCNC: 2.4 MMOL/L — CRITICAL LOW (ref 3.5–5)
POTASSIUM SERPL-MCNC: 4.3 MMOL/L — SIGNIFICANT CHANGE UP (ref 3.5–5)
POTASSIUM SERPL-MCNC: 4.4 MMOL/L — SIGNIFICANT CHANGE UP (ref 3.5–5)
POTASSIUM SERPL-SCNC: 2.4 MMOL/L — CRITICAL LOW (ref 3.5–5)
POTASSIUM SERPL-SCNC: 4.3 MMOL/L — SIGNIFICANT CHANGE UP (ref 3.5–5)
POTASSIUM SERPL-SCNC: 4.4 MMOL/L — SIGNIFICANT CHANGE UP (ref 3.5–5)
PROT SERPL-MCNC: 2.5 G/DL — LOW (ref 6–8)
PROT SERPL-MCNC: 4.8 G/DL — LOW (ref 6–8)
PROT SERPL-MCNC: 4.9 G/DL — LOW (ref 6–8)
RBC # BLD: 2.09 M/UL — LOW (ref 4.7–6.1)
RBC # BLD: 3.65 M/UL — LOW (ref 4.7–6.1)
RBC # BLD: 3.77 M/UL — LOW (ref 4.7–6.1)
RBC # FLD: 12 % — SIGNIFICANT CHANGE UP (ref 11.5–14.5)
RBC # FLD: 12.2 % — SIGNIFICANT CHANGE UP (ref 11.5–14.5)
RBC # FLD: 12.4 % — SIGNIFICANT CHANGE UP (ref 11.5–14.5)
SODIUM SERPL-SCNC: 136 MMOL/L — SIGNIFICANT CHANGE UP (ref 135–146)
SODIUM SERPL-SCNC: 141 MMOL/L — SIGNIFICANT CHANGE UP (ref 135–146)
SODIUM SERPL-SCNC: 142 MMOL/L — SIGNIFICANT CHANGE UP (ref 135–146)
TROPONIN T SERPL-MCNC: 0.01 NG/ML — SIGNIFICANT CHANGE UP
TROPONIN T SERPL-MCNC: 0.01 NG/ML — SIGNIFICANT CHANGE UP
TROPONIN T SERPL-MCNC: 0.02 NG/ML — HIGH
TROPONIN T SERPL-MCNC: 0.02 NG/ML — HIGH
WBC # BLD: 15.4 K/UL — HIGH (ref 4.8–10.8)
WBC # BLD: 17.98 K/UL — HIGH (ref 4.8–10.8)
WBC # BLD: 9.33 K/UL — SIGNIFICANT CHANGE UP (ref 4.8–10.8)
WBC # FLD AUTO: 15.4 K/UL — HIGH (ref 4.8–10.8)
WBC # FLD AUTO: 17.98 K/UL — HIGH (ref 4.8–10.8)
WBC # FLD AUTO: 9.33 K/UL — SIGNIFICANT CHANGE UP (ref 4.8–10.8)

## 2021-08-11 PROCEDURE — 99291 CRITICAL CARE FIRST HOUR: CPT

## 2021-08-11 PROCEDURE — 93010 ELECTROCARDIOGRAM REPORT: CPT

## 2021-08-11 RX ORDER — OXYCODONE HYDROCHLORIDE 5 MG/1
10 TABLET ORAL EVERY 4 HOURS
Refills: 0 | Status: DISCONTINUED | OUTPATIENT
Start: 2021-08-11 | End: 2021-08-15

## 2021-08-11 RX ORDER — POLYETHYLENE GLYCOL 3350 17 G/17G
17 POWDER, FOR SOLUTION ORAL EVERY 12 HOURS
Refills: 0 | Status: DISCONTINUED | OUTPATIENT
Start: 2021-08-11 | End: 2021-08-15

## 2021-08-11 RX ORDER — OXYCODONE HYDROCHLORIDE 5 MG/1
5 TABLET ORAL EVERY 4 HOURS
Refills: 0 | Status: DISCONTINUED | OUTPATIENT
Start: 2021-08-11 | End: 2021-08-15

## 2021-08-11 RX ORDER — DEXAMETHASONE 0.5 MG/5ML
2 ELIXIR ORAL ONCE
Refills: 0 | Status: COMPLETED | OUTPATIENT
Start: 2021-08-15 | End: 2021-08-15

## 2021-08-11 RX ORDER — ASPIRIN/CALCIUM CARB/MAGNESIUM 324 MG
81 TABLET ORAL DAILY
Refills: 0 | Status: DISCONTINUED | OUTPATIENT
Start: 2021-08-11 | End: 2021-08-15

## 2021-08-11 RX ORDER — HEPARIN SODIUM 5000 [USP'U]/ML
5000 INJECTION INTRAVENOUS; SUBCUTANEOUS EVERY 12 HOURS
Refills: 0 | Status: DISCONTINUED | OUTPATIENT
Start: 2021-08-11 | End: 2021-08-14

## 2021-08-11 RX ORDER — DEXAMETHASONE 0.5 MG/5ML
2 ELIXIR ORAL EVERY 12 HOURS
Refills: 0 | Status: COMPLETED | OUTPATIENT
Start: 2021-08-14 | End: 2021-08-14

## 2021-08-11 RX ORDER — SODIUM CHLORIDE 9 MG/ML
500 INJECTION, SOLUTION INTRAVENOUS ONCE
Refills: 0 | Status: COMPLETED | OUTPATIENT
Start: 2021-08-11 | End: 2021-08-11

## 2021-08-11 RX ORDER — DEXAMETHASONE 0.5 MG/5ML
2 ELIXIR ORAL EVERY 6 HOURS
Refills: 0 | Status: COMPLETED | OUTPATIENT
Start: 2021-08-12 | End: 2021-08-13

## 2021-08-11 RX ORDER — DEXAMETHASONE 0.5 MG/5ML
2 ELIXIR ORAL EVERY 4 HOURS
Refills: 0 | Status: COMPLETED | OUTPATIENT
Start: 2021-08-11 | End: 2021-08-12

## 2021-08-11 RX ORDER — METOPROLOL TARTRATE 50 MG
12.5 TABLET ORAL
Refills: 0 | Status: DISCONTINUED | OUTPATIENT
Start: 2021-08-11 | End: 2021-08-15

## 2021-08-11 RX ORDER — ACETAMINOPHEN 500 MG
650 TABLET ORAL EVERY 6 HOURS
Refills: 0 | Status: COMPLETED | OUTPATIENT
Start: 2021-08-11 | End: 2021-08-12

## 2021-08-11 RX ORDER — DEXAMETHASONE 0.5 MG/5ML
ELIXIR ORAL
Refills: 0 | Status: COMPLETED | OUTPATIENT
Start: 2021-08-11 | End: 2021-08-16

## 2021-08-11 RX ADMIN — SODIUM CHLORIDE 500 MILLILITER(S): 9 INJECTION, SOLUTION INTRAVENOUS at 23:00

## 2021-08-11 RX ADMIN — OXYCODONE HYDROCHLORIDE 5 MILLIGRAM(S): 5 TABLET ORAL at 14:00

## 2021-08-11 RX ADMIN — METHOCARBAMOL 750 MILLIGRAM(S): 500 TABLET, FILM COATED ORAL at 12:12

## 2021-08-11 RX ADMIN — SODIUM CHLORIDE 1000 MILLILITER(S): 9 INJECTION, SOLUTION INTRAVENOUS at 18:33

## 2021-08-11 RX ADMIN — CHLORHEXIDINE GLUCONATE 1 APPLICATION(S): 213 SOLUTION TOPICAL at 05:05

## 2021-08-11 RX ADMIN — METHOCARBAMOL 750 MILLIGRAM(S): 500 TABLET, FILM COATED ORAL at 05:05

## 2021-08-11 RX ADMIN — Medication 2 MILLIGRAM(S): at 22:11

## 2021-08-11 RX ADMIN — Medication 650 MILLIGRAM(S): at 12:12

## 2021-08-11 RX ADMIN — Medication 650 MILLIGRAM(S): at 18:31

## 2021-08-11 RX ADMIN — METHOCARBAMOL 750 MILLIGRAM(S): 500 TABLET, FILM COATED ORAL at 00:23

## 2021-08-11 RX ADMIN — Medication 100 MILLIGRAM(S): at 05:05

## 2021-08-11 RX ADMIN — Medication 4 MILLIGRAM(S): at 12:12

## 2021-08-11 RX ADMIN — METHOCARBAMOL 750 MILLIGRAM(S): 500 TABLET, FILM COATED ORAL at 18:31

## 2021-08-11 RX ADMIN — PANTOPRAZOLE SODIUM 40 MILLIGRAM(S): 20 TABLET, DELAYED RELEASE ORAL at 05:05

## 2021-08-11 RX ADMIN — HEPARIN SODIUM 5000 UNIT(S): 5000 INJECTION INTRAVENOUS; SUBCUTANEOUS at 18:32

## 2021-08-11 RX ADMIN — Medication 4 MILLIGRAM(S): at 00:23

## 2021-08-11 RX ADMIN — ATORVASTATIN CALCIUM 80 MILLIGRAM(S): 80 TABLET, FILM COATED ORAL at 22:11

## 2021-08-11 RX ADMIN — Medication 81 MILLIGRAM(S): at 12:12

## 2021-08-11 RX ADMIN — POLYETHYLENE GLYCOL 3350 17 GRAM(S): 17 POWDER, FOR SOLUTION ORAL at 18:32

## 2021-08-11 RX ADMIN — Medication 4 MILLIGRAM(S): at 05:06

## 2021-08-11 RX ADMIN — Medication 650 MILLIGRAM(S): at 05:06

## 2021-08-11 RX ADMIN — Medication 1 TABLET(S): at 12:12

## 2021-08-11 RX ADMIN — Medication 650 MILLIGRAM(S): at 05:46

## 2021-08-11 RX ADMIN — Medication 650 MILLIGRAM(S): at 12:45

## 2021-08-11 RX ADMIN — Medication 100 MILLIGRAM(S): at 15:04

## 2021-08-11 RX ADMIN — Medication 2 MILLIGRAM(S): at 18:31

## 2021-08-11 NOTE — OCCUPATIONAL THERAPY INITIAL EVALUATION ADULT - HOME MANAGEMENT SKILLS, PREVIOUS LEVEL OF FUNCTION, OT EVAL
· Reports he had a fall with his dog, landing on his left hip  · Has been ambulating with a limp  · OSH CT scan with reported fracture (unable to be seen on Epic or Care Everywhere) but XR here with AVN  · Check MRI to further evaluate  · PT eval for DME  · Might need Ortho     independent

## 2021-08-11 NOTE — PHYSICAL THERAPY INITIAL EVALUATION ADULT - GENERAL OBSERVATIONS, REHAB EVAL
8:30-9:00. chart reviewed. Pt received semi-russell at B/S, alert, oriented, able to follow multi-step instructions and agreeable to PT evaluation. + IV, + A -line + PCA pump, + foly, + O2 2 L via NC, SPO2 97%, + thoracic incisional dressing, CC incisional pain 6/10. VSS, sensation intact, Motor intact NAD.

## 2021-08-11 NOTE — PHYSICAL THERAPY INITIAL EVALUATION ADULT - GAIT TRAINING, PT EVAL
Pt will ambulate using RW or least restrictive AD for 150 ft with supervision by discharge to facilitate return to PLOF.

## 2021-08-11 NOTE — PROGRESS NOTE ADULT - ASSESSMENT
Assessment:  76 year male PMHx Schwannoma, HLD, HTN, now POD #1 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     Plan:  Neurological:  - Neuro Checks Q4H  - D/C cordis   - Activate a CODE Stroke with change in Neuro exam  - Dexamethasone taper x 7days as per NeuroSx  - Restart ASA 81mg q24hrs  - D/C Dilaudid PCA pump  - Pain control: start Oxycodone prn for pain  - Tylenol 650mg PO q6hrs x 24 hours    Cardiovascular:  - MAP > 70 per cards input  - EKG  - Trend cardiac enzymes  - Normovolemia  - Normothermia   - Patient with significant bifascicular block in with significant coronary disease on recent [facility]- pt with potential risk to develop advanced heart block- A line in place and pacer pads at bedside  - Restart Beta blocker today    Pulmonary:  - RA, SaO2 100%  - HOB 45  - Incentive spirometry     GI/:  - Diet: Regular, can restart now  - Bowel Regimen: Senna/Colace  - Start Miralax   - D/C Protonix  - Strict Is/Os  - Keep diamond catheter for another 24hrs    Electrolytes:  - D/C IVFs  - Strict I/Os, daily weights  - Replete as needed.  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     Plan:  Neurological:  - Neuro Checks Q4H  - D/C cordis   - Dexamethasone taper x 7days as per NeuroSx  - Restart ASA 81mg q24hrs  - D/C Dilaudid PCA pump  - Pain control: start Oxycodone prn for pain  - Tylenol 650mg PO q6hrs x 24 hours    Cardiovascular:  - MAP > 70 per cards input  - EKG  - Trend cardiac enzymes  - Normovolemia  - Normothermia   - Patient with significant bifascicular block in with significant coronary disease on recent [facility]- pt with potential risk to develop advanced heart block- A line in place and pacer pads at bedside  - Restart Beta blocker today    Pulmonary:  - RA, SaO2 100%  - HOB 45  - Incentive spirometry     GI/:  - Diet: Regular, can restart now  - Bowel Regimen: Senna/Colace  - Start Miralax   - D/C Protonix  - Strict Is/Os  - Keep diamond catheter for another 24hrs    Electrolytes:  - D/C IVFs  - Strict I/Os, daily weights  - Replete as needed.  - Mg > 2  - Normoglycemia  - Normonatremia    ID:  - Ancef 1 g Q8    Hematology:  - SCDs  - Start Heparin 5000units SQ q12hrs for DVT PPX    FLUIDS/ELECTROLYTES/NUTRITION  -Monitor, Replete to K>4 and Mg>2  -Diet: Reg  PROPHYLAXIS  -DVT: SCD  -GI:    Code Status: Full    Dispo: ICU    Discussed with Attending Physician: Schwab Jennifer Holtzbach, [facility]  #[id number]

## 2021-08-11 NOTE — OCCUPATIONAL THERAPY INITIAL EVALUATION ADULT - PLANNED THERAPY INTERVENTIONS, OT EVAL
ADL retraining/balance training/bed mobility training/fine motor coordination training/neuromuscular re-education/parent/caregiver training.../ROM/strengthening/transfer training

## 2021-08-11 NOTE — OCCUPATIONAL THERAPY INITIAL EVALUATION ADULT - GENERAL OBSERVATIONS, REHAB EVAL
Pt received seated in b/s chair in NAD, +triple lumen IJ, +tele, +BP cuff, +pulse oxi, +diamond, agreeable to OT evaluation, left seated in b/s chair in NAD, all lines intact, RN aware

## 2021-08-11 NOTE — PHYSICAL THERAPY INITIAL EVALUATION ADULT - PERTINENT HX OF CURRENT PROBLEM, REHAB EVAL
76 year-old male PMH HTN and HLD on ASA-81 who is presenting for admission for T11-T12 schwannoma resection. Patient claims he has been having severe lower back pain that radiates around to his anterior abdomen since about March. Patient claims pain is worse when sitting and even worse when lying down. He was seen and examined at bedside in ED.

## 2021-08-12 DIAGNOSIS — E78.5 HYPERLIPIDEMIA, UNSPECIFIED: ICD-10-CM

## 2021-08-12 DIAGNOSIS — R07.9 CHEST PAIN, UNSPECIFIED: ICD-10-CM

## 2021-08-12 DIAGNOSIS — I10 ESSENTIAL (PRIMARY) HYPERTENSION: ICD-10-CM

## 2021-08-12 DIAGNOSIS — Z87.891 PERSONAL HISTORY OF NICOTINE DEPENDENCE: ICD-10-CM

## 2021-08-12 DIAGNOSIS — Z87.442 PERSONAL HISTORY OF URINARY CALCULI: ICD-10-CM

## 2021-08-12 DIAGNOSIS — D36.10 BENIGN NEOPLASM OF PERIPHERAL NERVES AND AUTONOMIC NERVOUS SYSTEM, UNSPECIFIED: ICD-10-CM

## 2021-08-12 DIAGNOSIS — I25.118 ATHEROSCLEROTIC HEART DISEASE OF NATIVE CORONARY ARTERY WITH OTHER FORMS OF ANGINA PECTORIS: ICD-10-CM

## 2021-08-12 LAB
ANION GAP SERPL CALC-SCNC: 9 MMOL/L — SIGNIFICANT CHANGE UP (ref 7–14)
BUN SERPL-MCNC: 18 MG/DL — SIGNIFICANT CHANGE UP (ref 10–20)
CALCIUM SERPL-MCNC: 8.2 MG/DL — LOW (ref 8.5–10.1)
CHLORIDE SERPL-SCNC: 105 MMOL/L — SIGNIFICANT CHANGE UP (ref 98–110)
CO2 SERPL-SCNC: 26 MMOL/L — SIGNIFICANT CHANGE UP (ref 17–32)
CREAT SERPL-MCNC: 0.6 MG/DL — LOW (ref 0.7–1.5)
GLUCOSE SERPL-MCNC: 131 MG/DL — HIGH (ref 70–99)
HCT VFR BLD CALC: 36.7 % — LOW (ref 42–52)
HGB BLD-MCNC: 12.8 G/DL — LOW (ref 14–18)
MAGNESIUM SERPL-MCNC: 2.1 MG/DL — SIGNIFICANT CHANGE UP (ref 1.8–2.4)
MCHC RBC-ENTMCNC: 33.2 PG — HIGH (ref 27–31)
MCHC RBC-ENTMCNC: 34.9 G/DL — SIGNIFICANT CHANGE UP (ref 32–37)
MCV RBC AUTO: 95.3 FL — HIGH (ref 80–94)
NRBC # BLD: 0 /100 WBCS — SIGNIFICANT CHANGE UP (ref 0–0)
PHOSPHATE SERPL-MCNC: 2.3 MG/DL — SIGNIFICANT CHANGE UP (ref 2.1–4.9)
PLATELET # BLD AUTO: 181 K/UL — SIGNIFICANT CHANGE UP (ref 130–400)
POTASSIUM SERPL-MCNC: 4.3 MMOL/L — SIGNIFICANT CHANGE UP (ref 3.5–5)
POTASSIUM SERPL-SCNC: 4.3 MMOL/L — SIGNIFICANT CHANGE UP (ref 3.5–5)
RBC # BLD: 3.85 M/UL — LOW (ref 4.7–6.1)
RBC # FLD: 12.4 % — SIGNIFICANT CHANGE UP (ref 11.5–14.5)
SODIUM SERPL-SCNC: 140 MMOL/L — SIGNIFICANT CHANGE UP (ref 135–146)
SURGICAL PATHOLOGY STUDY: SIGNIFICANT CHANGE UP
TROPONIN T SERPL-MCNC: 0.01 NG/ML — SIGNIFICANT CHANGE UP
WBC # BLD: 16.2 K/UL — HIGH (ref 4.8–10.8)
WBC # FLD AUTO: 16.2 K/UL — HIGH (ref 4.8–10.8)

## 2021-08-12 PROCEDURE — 99291 CRITICAL CARE FIRST HOUR: CPT

## 2021-08-12 RX ORDER — ACETAMINOPHEN 500 MG
650 TABLET ORAL EVERY 6 HOURS
Refills: 0 | Status: DISCONTINUED | OUTPATIENT
Start: 2021-08-12 | End: 2021-08-15

## 2021-08-12 RX ADMIN — POLYETHYLENE GLYCOL 3350 17 GRAM(S): 17 POWDER, FOR SOLUTION ORAL at 05:05

## 2021-08-12 RX ADMIN — Medication 650 MILLIGRAM(S): at 00:34

## 2021-08-12 RX ADMIN — Medication 12.5 MILLIGRAM(S): at 17:50

## 2021-08-12 RX ADMIN — Medication 2 MILLIGRAM(S): at 05:06

## 2021-08-12 RX ADMIN — Medication 81 MILLIGRAM(S): at 13:08

## 2021-08-12 RX ADMIN — METHOCARBAMOL 750 MILLIGRAM(S): 500 TABLET, FILM COATED ORAL at 00:34

## 2021-08-12 RX ADMIN — OXYCODONE HYDROCHLORIDE 5 MILLIGRAM(S): 5 TABLET ORAL at 10:30

## 2021-08-12 RX ADMIN — HEPARIN SODIUM 5000 UNIT(S): 5000 INJECTION INTRAVENOUS; SUBCUTANEOUS at 05:05

## 2021-08-12 RX ADMIN — OXYCODONE HYDROCHLORIDE 5 MILLIGRAM(S): 5 TABLET ORAL at 15:58

## 2021-08-12 RX ADMIN — POLYETHYLENE GLYCOL 3350 17 GRAM(S): 17 POWDER, FOR SOLUTION ORAL at 17:50

## 2021-08-12 RX ADMIN — ATORVASTATIN CALCIUM 80 MILLIGRAM(S): 80 TABLET, FILM COATED ORAL at 21:04

## 2021-08-12 RX ADMIN — Medication 2 MILLIGRAM(S): at 13:08

## 2021-08-12 RX ADMIN — METHOCARBAMOL 750 MILLIGRAM(S): 500 TABLET, FILM COATED ORAL at 17:50

## 2021-08-12 RX ADMIN — CHLORHEXIDINE GLUCONATE 1 APPLICATION(S): 213 SOLUTION TOPICAL at 06:25

## 2021-08-12 RX ADMIN — HEPARIN SODIUM 5000 UNIT(S): 5000 INJECTION INTRAVENOUS; SUBCUTANEOUS at 17:49

## 2021-08-12 RX ADMIN — Medication 1 TABLET(S): at 13:08

## 2021-08-12 RX ADMIN — Medication 2 MILLIGRAM(S): at 15:58

## 2021-08-12 RX ADMIN — OXYCODONE HYDROCHLORIDE 5 MILLIGRAM(S): 5 TABLET ORAL at 13:02

## 2021-08-12 RX ADMIN — METHOCARBAMOL 750 MILLIGRAM(S): 500 TABLET, FILM COATED ORAL at 05:06

## 2021-08-12 RX ADMIN — METHOCARBAMOL 750 MILLIGRAM(S): 500 TABLET, FILM COATED ORAL at 13:08

## 2021-08-12 NOTE — CHART NOTE - NSCHARTNOTEFT_GEN_A_CORE
Spoke with Dr. Loza regarding bed transfer with telemetry or without telemetry. It was confirmed via phone that it is ok to transfer patient to a non-telemetry unit. Thank you.

## 2021-08-12 NOTE — CHART NOTE - NSCHARTNOTEFT_GEN_A_CORE
NEUROCRITICAL CARE     HPI: Patient is a 76 year-old male PMH HTN and HLD on ASA-81 who is presenting for admission for T11-T12 schwannoma resection. Patient claims he has been having severe lower back pain that radiates around to his anterior abdomen since about March. Patient claims pain is worse when sitting and even worse when lying down. He was seen and examined at bedside in ED. Pt standing up for most of examination secondary to LBP while sitting. He is following all commands and endorses LBP with radiation around abdomen. Denies headaches, weakness, paresthesias, saddle anesthesia, incontinence, chest pain, or SOB.  (09 Aug 2021 18:23)      Past Medical and Surgical Hx:  PAST MEDICAL & SURGICAL HISTORY:  HTN (hypertension)  HLD (hyperlipidemia)      Medications Current and PRN:  MEDICATIONS  (STANDING):  aspirin  chewable 81 milliGRAM(s) Oral daily  atorvastatin 80 milliGRAM(s) Oral at bedtime  chlorhexidine 4% Liquid 1 Application(s) Topical <User Schedule>  dexAMETHasone     Tablet   Oral   dexAMETHasone     Tablet 2 milliGRAM(s) Oral every 4 hours  dexAMETHasone     Tablet 2 milliGRAM(s) Oral every 6 hours  heparin   Injectable 5000 Unit(s) SubCutaneous every 12 hours  methocarbamol 750 milliGRAM(s) Oral every 6 hours  metoprolol tartrate 12.5 milliGRAM(s) Oral two times a day  multivitamin 1 Tablet(s) Oral daily  polyethylene glycol 3350 17 Gram(s) Oral every 12 hours    MEDICATIONS  (PRN):  acetaminophen   Tablet .. 650 milliGRAM(s) Oral every 6 hours PRN Temp greater or equal to 38C (100.4F), Mild Pain (1 - 3), Moderate Pain (4 - 6)  ondansetron Injectable 4 milliGRAM(s) IV Push every 6 hours PRN Nausea and/or Vomiting  oxyCODONE    IR 5 milliGRAM(s) Oral every 4 hours PRN Moderate Pain (4 - 6)  oxyCODONE    IR 10 milliGRAM(s) Oral every 4 hours PRN Severe Pain (7 - 10)  senna 2 Tablet(s) Oral at bedtime PRN Constipation      Allergies: No Known Allergies      ROS: Patient endorses no complaints at this time. Otherwise, 10-point ROS is negative.     Vital Signs:  ICU Vital Signs Last 24 Hrs  T(C): 36.7 (12 Aug 2021 08:00), Max: 36.7 (12 Aug 2021 08:00)  T(F): 98 (12 Aug 2021 08:00), Max: 98 (12 Aug 2021 08:00)  HR: 92 (12 Aug 2021 12:00) (62 - 98)  BP: 129/66 (12 Aug 2021 12:00) (92/43 - 134/61)  BP(mean): 85 (12 Aug 2021 12:00) (54 - 85)  ABP: 98/52 (12 Aug 2021 12:00) (82/46 - 158/60)  ABP(mean): 234 (12 Aug 2021 12:00) (58 - 234)  RR: 18 (12 Aug 2021 12:00) (11 - 28)  SpO2: 96% (12 Aug 2021 12:00) (96% - 100%)      Physical exam:  GENERAL: well built, well nourished male of appropriately stated age  HEAD:  Atraumatic, Normocephalic  EYES: EOMI, PERRLA, conjunctiva and sclera clear  ENT: No tonsillar erythema, exudates, or enlargement; Moist mucous membranes, Good dentition, No lesions  NECK: Supple, No JVD, Normal thyroid, no enlarged nodes  CHEST/LUNG: B/L good air entry; Lungs CTA; No rales, rhonchi, or wheezing  HEART: S1S2 normal, no S3, Regular rate and rhythm; No murmurs, rubs, or gallops  ABDOMEN: Soft, Nontender, Nondistended; Bowel sounds present  EXTREMITIES:  2+ Peripheral Pulses, No clubbing, cyanosis, or edema  LYMPH: No lymphadenopathy noted  SKIN: No rashes or lesions  Spinal dressing sanguinous drainage, dried with no evidence of infection    Neurologic exam:  Mental Status: Alert and Oriented to person, place, and time, cooperative, pleasant. Affect appropriate, thought, speech, and language coherent. No dysarthria, no aphasia  Cranial Nerves:  I: Deferred  II, III, IV, VI: 3 mm PERRLA, EOM intact. Araujo intact by confrontation. No cranial nerve palsy or nystagmus noted.  V: facial sensation intact; masseter/ temporal muscles intact, corneal reflex intact bilaterally  VII: face symmetrical at rest and with expression, with no loss of nasolabial folds  VIII: Deferred but appears grossly intact  IX and X: +gag/cough  XI: Deferred  XII: no dysarthria, tongue weakness/fasiculation   Motor: Normal muscle bulk/tone. Good posture. Strength 5+/5+ upper & 5+/5+ lower extremities  Sensory: Sensation to light touch and noxious stimuli throughout. - numbness, parasthesia, or abnormal sensation upon exam.   Cerebellar Function: Pronator drift negative, no dymetria on FNF or shin-knee test        I/Os:  I&O's Detail    11 Aug 2021 07:01  -  12 Aug 2021 07:00  --------------------------------------------------------  IN:    IV PiggyBack: 50 mL    Modular Fluid: 500 mL    multiple electrolytes Injection Type 1 Bolus: 500 mL    sodium chloride 0.9%: 210 mL  Total IN: 1260 mL    OUT:    Indwelling Catheter - Urethral (mL): 1719 mL  Total OUT: 1719 mL    Total NET: -459 mL      12 Aug 2021 07:01  -  12 Aug 2021 14:28  --------------------------------------------------------  IN:  Total IN: 0 mL    OUT:    Indwelling Catheter - Urethral (mL): 575 mL  Total OUT: 575 mL    Total NET: -575 mL          Labs:  08-12    140  |  105  |  18  ----------------------------<  131<H>  4.3   |  26  |  0.6<L>    Ca    8.2<L>      12 Aug 2021 06:35  Phos  2.3     08-12  Mg     2.1     08-12    TPro  4.8<L>  /  Alb  3.1<L>  /  TBili  0.6  /  DBili  x   /  AST  19  /  ALT  12  /  AlkPhos  46  08-11    CBC Full  -  ( 12 Aug 2021 06:35 )  WBC Count : 16.20 K/uL  RBC Count : 3.85 M/uL  Hemoglobin : 12.8 g/dL  Hematocrit : 36.7 %  Platelet Count - Automated : 181 K/uL  Mean Cell Volume : 95.3 fL  Mean Cell Hemoglobin : 33.2 pg  Mean Cell Hemoglobin Concentration : 34.9 g/dL  Auto Neutrophil # : x  Auto Lymphocyte # : x  Auto Monocyte # : x  Auto Eosinophil # : x  Auto Basophil # : x  Auto Neutrophil % : x  Auto Lymphocyte % : x  Auto Monocyte % : x  Auto Eosinophil % : x  Auto Basophil % : x    PT/INR - ( 10 Aug 2021 17:20 )   PT: 13.50 sec;   INR: 1.18 ratio         PTT - ( 10 Aug 2021 17:20 )  PTT:21.9 sec  LIVER FUNCTIONS - ( 11 Aug 2021 18:30 )  Alb: 3.1 g/dL / Pro: 4.8 g/dL / ALK PHOS: 46 U/L / ALT: 12 U/L / AST: 19 U/L / GGT: x           ABG - ( 12 Aug 2021 11:12 )  pH, Arterial: 7.35  pH, Blood: x     /  pCO2: 43    /  pO2: 169   / HCO3: 24    / Base Excess: -2.0  /  SaO2: 99          Radiology:  No new neuroimaging to date     Assessment:  76 year male PMHx Schwannoma, HLD, HTN, now POD #1 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     Plan:    Neurological:  - Neuro Checks Q4H  - D/C cordis   - Dexamethasone taper x 7 days as per NeuroSx  - ASA 81mg q24hrs  - D/C Dilaudid PCA pump  - Pain control: start Oxycodone prn for pain  - Tylenol 650mg PO q6hrs x 24 hours  - neurosurgery recommendations appreciated     Cardiovascular:  - MAP > 70 per cards input  - EKG  - Trend cardiac enzymes  - Normovolemia  - Normothermia   - Patient with significant bifascicular block in with significant coronary disease on recent C- pt with potential risk to develop advanced heart block- A line in place and pacer pads at bedside  - Metoprolol ok to continue 12.5 BID   - d/c A-line   - Cardiology recs about future stent placement     Pulmonary:  - RA, SaO2 100%  - HOB above 30   - Incentive spirometry     GI/:  - Diet: Regular, can restart now  - Bowel Regimen: Senna/Colace  - Start Miralax   - D/C Protonix  - Strict Is/Os  - d/c diamond     Electrolytes:  - D/C IVFs  - Strict I/Os, daily weights  - Replete as needed.  - Mg > 2  - Normoglycemia  - Normonatremia    Hematology:  - SCDs  - Start Heparin 5000units SQ q12hrs for DVT PPX    FLUIDS/ELECTROLYTES/NUTRITION  -Monitor, Replete to K>4 and Mg>2  -Diet: Reg    PROPHYLAXIS  -DVT: SCD and heparin sub q      Code Status: Full  Dispo: 3E    RODERICK Lyons  Department: Neuro ICU  Please feel free to contact for any questions or concerns. Thank you. Case discussed with Neurocritical Care attending. Please see attending attestation below.  Extension: #[phone] NEUROCRITICAL CARE     HPI: Patient is a 76 year-old male PMH HTN and HLD on ASA-81 who is presenting for admission for T11-T12 schwannoma resection. Patient claims he has been having severe lower back pain that radiates around to his anterior abdomen since about March. Patient claims pain is worse when sitting and even worse when lying down. He was seen and examined at bedside in ED. Pt standing up for most of examination secondary to LBP while sitting. He is following all commands and endorses LBP with radiation around abdomen. Denies headaches, weakness, paresthesias, saddle anesthesia, incontinence, chest pain, or SOB.  (09 Aug 2021 18:23)      Past Medical and Surgical Hx:  PAST MEDICAL & SURGICAL HISTORY:  HTN (hypertension)  HLD (hyperlipidemia)      Medications Current and PRN:  MEDICATIONS  (STANDING):  aspirin  chewable 81 milliGRAM(s) Oral daily  atorvastatin 80 milliGRAM(s) Oral at bedtime  chlorhexidine 4% Liquid 1 Application(s) Topical <User Schedule>  dexAMETHasone     Tablet   Oral   dexAMETHasone     Tablet 2 milliGRAM(s) Oral every 4 hours  dexAMETHasone     Tablet 2 milliGRAM(s) Oral every 6 hours  heparin   Injectable 5000 Unit(s) SubCutaneous every 12 hours  methocarbamol 750 milliGRAM(s) Oral every 6 hours  metoprolol tartrate 12.5 milliGRAM(s) Oral two times a day  multivitamin 1 Tablet(s) Oral daily  polyethylene glycol 3350 17 Gram(s) Oral every 12 hours    MEDICATIONS  (PRN):  acetaminophen   Tablet .. 650 milliGRAM(s) Oral every 6 hours PRN Temp greater or equal to 38C (100.4F), Mild Pain (1 - 3), Moderate Pain (4 - 6)  ondansetron Injectable 4 milliGRAM(s) IV Push every 6 hours PRN Nausea and/or Vomiting  oxyCODONE    IR 5 milliGRAM(s) Oral every 4 hours PRN Moderate Pain (4 - 6)  oxyCODONE    IR 10 milliGRAM(s) Oral every 4 hours PRN Severe Pain (7 - 10)  senna 2 Tablet(s) Oral at bedtime PRN Constipation      Allergies: No Known Allergies      ROS: Patient endorses no complaints at this time. Otherwise, 10-point ROS is negative.     Vital Signs:  ICU Vital Signs Last 24 Hrs  T(C): 36.7 (12 Aug 2021 08:00), Max: 36.7 (12 Aug 2021 08:00)  T(F): 98 (12 Aug 2021 08:00), Max: 98 (12 Aug 2021 08:00)  HR: 92 (12 Aug 2021 12:00) (62 - 98)  BP: 129/66 (12 Aug 2021 12:00) (92/43 - 134/61)  BP(mean): 85 (12 Aug 2021 12:00) (54 - 85)  ABP: 98/52 (12 Aug 2021 12:00) (82/46 - 158/60)  ABP(mean): 234 (12 Aug 2021 12:00) (58 - 234)  RR: 18 (12 Aug 2021 12:00) (11 - 28)  SpO2: 96% (12 Aug 2021 12:00) (96% - 100%)      Physical exam:  GENERAL: well built, well nourished male of appropriately stated age  HEAD:  Atraumatic, Normocephalic  EYES: EOMI, PERRLA, conjunctiva and sclera clear  ENT: No tonsillar erythema, exudates, or enlargement; Moist mucous membranes, Good dentition, No lesions  NECK: Supple, No JVD, Normal thyroid, no enlarged nodes  CHEST/LUNG: B/L good air entry; Lungs CTA; No rales, rhonchi, or wheezing  HEART: S1S2 normal, no S3, Regular rate and rhythm; No murmurs, rubs, or gallops  ABDOMEN: Soft, Nontender, Nondistended; Bowel sounds present  EXTREMITIES:  2+ Peripheral Pulses, No clubbing, cyanosis, or edema  LYMPH: No lymphadenopathy noted  SKIN: No rashes or lesions  Spinal dressing sanguinous drainage, dried with no evidence of infection    Neurologic exam:  Mental Status: Alert and Oriented to person, place, and time, cooperative, pleasant. Affect appropriate, thought, speech, and language coherent. No dysarthria, no aphasia  Cranial Nerves:  I: Deferred  II, III, IV, VI: 3 mm PERRLA, EOM intact. Araujo intact by confrontation. No cranial nerve palsy or nystagmus noted.  V: facial sensation intact; masseter/ temporal muscles intact, corneal reflex intact bilaterally  VII: face symmetrical at rest and with expression, with no loss of nasolabial folds  VIII: Deferred but appears grossly intact  IX and X: +gag/cough  XI: Deferred  XII: no dysarthria, tongue weakness/fasiculation   Motor: Normal muscle bulk/tone. Good posture. Strength 5+/5+ upper & 5+/5+ lower extremities  Sensory: Sensation to light touch and noxious stimuli throughout. - numbness, parasthesia, or abnormal sensation upon exam.   Cerebellar Function: Pronator drift negative, no dymetria on FNF or shin-knee test        I/Os:  I&O's Detail    11 Aug 2021 07:01  -  12 Aug 2021 07:00  --------------------------------------------------------  IN:    IV PiggyBack: 50 mL    Modular Fluid: 500 mL    multiple electrolytes Injection Type 1 Bolus: 500 mL    sodium chloride 0.9%: 210 mL  Total IN: 1260 mL    OUT:    Indwelling Catheter - Urethral (mL): 1719 mL  Total OUT: 1719 mL    Total NET: -459 mL      12 Aug 2021 07:01  -  12 Aug 2021 14:28  --------------------------------------------------------  IN:  Total IN: 0 mL    OUT:    Indwelling Catheter - Urethral (mL): 575 mL  Total OUT: 575 mL    Total NET: -575 mL          Labs:  08-12    140  |  105  |  18  ----------------------------<  131<H>  4.3   |  26  |  0.6<L>    Ca    8.2<L>      12 Aug 2021 06:35  Phos  2.3     08-12  Mg     2.1     08-12    TPro  4.8<L>  /  Alb  3.1<L>  /  TBili  0.6  /  DBili  x   /  AST  19  /  ALT  12  /  AlkPhos  46  08-11    CBC Full  -  ( 12 Aug 2021 06:35 )  WBC Count : 16.20 K/uL  RBC Count : 3.85 M/uL  Hemoglobin : 12.8 g/dL  Hematocrit : 36.7 %  Platelet Count - Automated : 181 K/uL  Mean Cell Volume : 95.3 fL  Mean Cell Hemoglobin : 33.2 pg  Mean Cell Hemoglobin Concentration : 34.9 g/dL  Auto Neutrophil # : x  Auto Lymphocyte # : x  Auto Monocyte # : x  Auto Eosinophil # : x  Auto Basophil # : x  Auto Neutrophil % : x  Auto Lymphocyte % : x  Auto Monocyte % : x  Auto Eosinophil % : x  Auto Basophil % : x    PT/INR - ( 10 Aug 2021 17:20 )   PT: 13.50 sec;   INR: 1.18 ratio         PTT - ( 10 Aug 2021 17:20 )  PTT:21.9 sec  LIVER FUNCTIONS - ( 11 Aug 2021 18:30 )  Alb: 3.1 g/dL / Pro: 4.8 g/dL / ALK PHOS: 46 U/L / ALT: 12 U/L / AST: 19 U/L / GGT: x           ABG - ( 12 Aug 2021 11:12 )  pH, Arterial: 7.35  pH, Blood: x     /  pCO2: 43    /  pO2: 169   / HCO3: 24    / Base Excess: -2.0  /  SaO2: 99          Radiology:  No new neuroimaging to date     Assessment:  76 year male PMHx Schwannoma, HLD, HTN, now POD #1 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     Today's Plan:    Neurological:  - Neuro Checks Q4H  - D/C cordis   - Dexamethasone taper x 7 days as per NeuroSx  - ASA 81mg q24hrs  - D/C Dilaudid PCA pump  - Pain control: start Oxycodone prn for pain  - Tylenol 650mg PO q6hrs x 24 hours  - neurosurgery recommendations appreciated     Cardiovascular:  - MAP > 70 per cards input  - EKG  - Trend cardiac enzymes  - Normovolemia  - Normothermia   - Patient with significant bifascicular block in with significant coronary disease on recent LHC- pt with potential risk to develop advanced heart block- A line in place and pacer pads at bedside  - Metoprolol ok to continue 12.5 BID as per cardiologist   - d/c A-line   - Cardiology recs about future stent placement     Pulmonary:  - RA, SaO2 100%  - HOB above 30   - Incentive spirometry     GI/:  - Diet: Regular, can restart now  - Bowel Regimen: Senna/Colace  - Start Miralax   - D/C Protonix  - Strict Is/Os  - d/c diamond     Electrolytes:  - D/C IVFs  - Strict I/Os, daily weights  - Replete as needed.  - Mg > 2  - Normoglycemia  - Normonatremia    Hematology:  - SCDs  - Start Heparin 5000units SQ q12hrs for DVT PPX    FLUIDS/ELECTROLYTES/NUTRITION  -Monitor, Replete to K>4 and Mg>2  -Diet: Reg    PROPHYLAXIS  -DVT: SCD and heparin sub q      Code Status: Full  Dispo: 3E    # T11-T12 schwannoma resection  - neurosurgery following, recommendations appreciated, call ext [phone]  - Dexamethasone taper x 7 days as per NeuroSx  - Pain control: start Oxycodone prn for pain  - Tylenol 650mg PO q6hrs x 24 hours    # Bifasicular heart block  - Cardiology follow - up with Dr. Lopez appreciated, last progress note on 8/7/2021 by Dr. Loza stated BB to be continued perioperatively and I called him to make sure that the patient can be on his metoprolol at 12.5 mg BID, which he was ok with       RODERICK Lyons  Department: Neuro ICU  Please feel free to contact for any questions or concerns. Thank you. Case discussed with Neurocritical Care attending. Please see attending attestation below.  Extension: #[phone] NEUROCRITICAL CARE     gave sign out to resident on 3E: Dr. Frost on spectra #[id number]    HPI: Patient is a 76 year-old male PMH HTN and HLD on ASA-81 who is presenting for admission for T11-T12 schwannoma resection. Patient claims he has been having severe lower back pain that radiates around to his anterior abdomen since about March. Patient claims pain is worse when sitting and even worse when lying down. He was seen and examined at bedside in ED. Pt standing up for most of examination secondary to LBP while sitting. He is following all commands and endorses LBP with radiation around abdomen. Denies headaches, weakness, paresthesias, saddle anesthesia, incontinence, chest pain, or SOB.  (09 Aug 2021 18:23)      Past Medical and Surgical Hx:  PAST MEDICAL & SURGICAL HISTORY:  HTN (hypertension)  HLD (hyperlipidemia)      Medications Current and PRN:  MEDICATIONS  (STANDING):  aspirin  chewable 81 milliGRAM(s) Oral daily  atorvastatin 80 milliGRAM(s) Oral at bedtime  chlorhexidine 4% Liquid 1 Application(s) Topical <User Schedule>  dexAMETHasone     Tablet   Oral   dexAMETHasone     Tablet 2 milliGRAM(s) Oral every 4 hours  dexAMETHasone     Tablet 2 milliGRAM(s) Oral every 6 hours  heparin   Injectable 5000 Unit(s) SubCutaneous every 12 hours  methocarbamol 750 milliGRAM(s) Oral every 6 hours  metoprolol tartrate 12.5 milliGRAM(s) Oral two times a day  multivitamin 1 Tablet(s) Oral daily  polyethylene glycol 3350 17 Gram(s) Oral every 12 hours    MEDICATIONS  (PRN):  acetaminophen   Tablet .. 650 milliGRAM(s) Oral every 6 hours PRN Temp greater or equal to 38C (100.4F), Mild Pain (1 - 3), Moderate Pain (4 - 6)  ondansetron Injectable 4 milliGRAM(s) IV Push every 6 hours PRN Nausea and/or Vomiting  oxyCODONE    IR 5 milliGRAM(s) Oral every 4 hours PRN Moderate Pain (4 - 6)  oxyCODONE    IR 10 milliGRAM(s) Oral every 4 hours PRN Severe Pain (7 - 10)  senna 2 Tablet(s) Oral at bedtime PRN Constipation      Allergies: No Known Allergies      ROS: Patient endorses no complaints at this time. Otherwise, 10-point ROS is negative.     Vital Signs:  ICU Vital Signs Last 24 Hrs  T(C): 36.7 (12 Aug 2021 08:00), Max: 36.7 (12 Aug 2021 08:00)  T(F): 98 (12 Aug 2021 08:00), Max: 98 (12 Aug 2021 08:00)  HR: 92 (12 Aug 2021 12:00) (62 - 98)  BP: 129/66 (12 Aug 2021 12:00) (92/43 - 134/61)  BP(mean): 85 (12 Aug 2021 12:00) (54 - 85)  ABP: 98/52 (12 Aug 2021 12:00) (82/46 - 158/60)  ABP(mean): 234 (12 Aug 2021 12:00) (58 - 234)  RR: 18 (12 Aug 2021 12:00) (11 - 28)  SpO2: 96% (12 Aug 2021 12:00) (96% - 100%)      Physical exam:  GENERAL: well built, well nourished male of appropriately stated age  HEAD:  Atraumatic, Normocephalic  EYES: EOMI, PERRLA, conjunctiva and sclera clear  ENT: No tonsillar erythema, exudates, or enlargement; Moist mucous membranes, Good dentition, No lesions  NECK: Supple, No JVD, Normal thyroid, no enlarged nodes  CHEST/LUNG: B/L good air entry; Lungs CTA; No rales, rhonchi, or wheezing  HEART: S1S2 normal, no S3, Regular rate and rhythm; No murmurs, rubs, or gallops  ABDOMEN: Soft, Nontender, Nondistended; Bowel sounds present  EXTREMITIES:  2+ Peripheral Pulses, No clubbing, cyanosis, or edema  LYMPH: No lymphadenopathy noted  SKIN: No rashes or lesions  Spinal dressing sanguinous drainage, dried with no evidence of infection    Neurologic exam:  Mental Status: Alert and Oriented to person, place, and time, cooperative, pleasant. Affect appropriate, thought, speech, and language coherent. No dysarthria, no aphasia  Cranial Nerves:  I: Deferred  II, III, IV, VI: 3 mm PERRLA, EOM intact. Araujo intact by confrontation. No cranial nerve palsy or nystagmus noted.  V: facial sensation intact; masseter/ temporal muscles intact, corneal reflex intact bilaterally  VII: face symmetrical at rest and with expression, with no loss of nasolabial folds  VIII: Deferred but appears grossly intact  IX and X: +gag/cough  XI: Deferred  XII: no dysarthria, tongue weakness/fasiculation   Motor: Normal muscle bulk/tone. Good posture. Strength 5+/5+ upper & 5+/5+ lower extremities  Sensory: Sensation to light touch and noxious stimuli throughout. - numbness, parasthesia, or abnormal sensation upon exam.   Cerebellar Function: Pronator drift negative, no dymetria on FNF or shin-knee test        I/Os:  I&O's Detail    11 Aug 2021 07:01  -  12 Aug 2021 07:00  --------------------------------------------------------  IN:    IV PiggyBack: 50 mL    Modular Fluid: 500 mL    multiple electrolytes Injection Type 1 Bolus: 500 mL    sodium chloride 0.9%: 210 mL  Total IN: 1260 mL    OUT:    Indwelling Catheter - Urethral (mL): 1719 mL  Total OUT: 1719 mL    Total NET: -459 mL      12 Aug 2021 07:01  -  12 Aug 2021 14:28  --------------------------------------------------------  IN:  Total IN: 0 mL    OUT:    Indwelling Catheter - Urethral (mL): 575 mL  Total OUT: 575 mL    Total NET: -575 mL          Labs:  08-12    140  |  105  |  18  ----------------------------<  131<H>  4.3   |  26  |  0.6<L>    Ca    8.2<L>      12 Aug 2021 06:35  Phos  2.3     08-12  Mg     2.1     08-12    TPro  4.8<L>  /  Alb  3.1<L>  /  TBili  0.6  /  DBili  x   /  AST  19  /  ALT  12  /  AlkPhos  46  08-11    CBC Full  -  ( 12 Aug 2021 06:35 )  WBC Count : 16.20 K/uL  RBC Count : 3.85 M/uL  Hemoglobin : 12.8 g/dL  Hematocrit : 36.7 %  Platelet Count - Automated : 181 K/uL  Mean Cell Volume : 95.3 fL  Mean Cell Hemoglobin : 33.2 pg  Mean Cell Hemoglobin Concentration : 34.9 g/dL  Auto Neutrophil # : x  Auto Lymphocyte # : x  Auto Monocyte # : x  Auto Eosinophil # : x  Auto Basophil # : x  Auto Neutrophil % : x  Auto Lymphocyte % : x  Auto Monocyte % : x  Auto Eosinophil % : x  Auto Basophil % : x    PT/INR - ( 10 Aug 2021 17:20 )   PT: 13.50 sec;   INR: 1.18 ratio         PTT - ( 10 Aug 2021 17:20 )  PTT:21.9 sec  LIVER FUNCTIONS - ( 11 Aug 2021 18:30 )  Alb: 3.1 g/dL / Pro: 4.8 g/dL / ALK PHOS: 46 U/L / ALT: 12 U/L / AST: 19 U/L / GGT: x           ABG - ( 12 Aug 2021 11:12 )  pH, Arterial: 7.35  pH, Blood: x     /  pCO2: 43    /  pO2: 169   / HCO3: 24    / Base Excess: -2.0  /  SaO2: 99          Radiology:  No new neuroimaging to date     Assessment:  76 year male PMHx Schwannoma, HLD, HTN, now POD #1 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     Today's Plan:    Neurological:  - Neuro Checks Q4H  - D/C cordis   - Dexamethasone taper x 7 days as per NeuroSx  - ASA 81mg q24hrs  - D/C Dilaudid PCA pump  - Pain control: start Oxycodone prn for pain  - Tylenol 650mg PO q6hrs x 24 hours  - neurosurgery recommendations appreciated     Cardiovascular:  - MAP > 70 per cards input  - EKG  - Trend cardiac enzymes  - Normovolemia  - Normothermia   - Patient with significant bifascicular block in with significant coronary disease on recent C- pt with potential risk to develop advanced heart block- A line in place and pacer pads at bedside  - Metoprolol ok to continue 12.5 BID as per cardiologist   - d/c A-line   - Cardiology recs about future stent placement     Pulmonary:  - RA, SaO2 100%  - HOB above 30   - Incentive spirometry     GI/:  - Diet: Regular, can restart now  - Bowel Regimen: Senna/Colace  - Start Miralax   - D/C Protonix  - Strict Is/Os  - d/c diamond     Electrolytes:  - D/C IVFs  - Strict I/Os, daily weights  - Replete as needed.  - Mg > 2  - Normoglycemia  - Normonatremia    Hematology:  - SCDs  - Start Heparin 5000units SQ q12hrs for DVT PPX    FLUIDS/ELECTROLYTES/NUTRITION  -Monitor, Replete to K>4 and Mg>2  -Diet: Reg    PROPHYLAXIS  -DVT: SCD and heparin sub q      Code Status: Full  Dispo: 3E    # T11-T12 schwannoma resection  - neurosurgery following, recommendations appreciated, call ext [phone]  - Dexamethasone taper x 7 days as per NeuroSx  - Pain control: start Oxycodone prn for pain  - Tylenol 650mg PO q6hrs x 24 hours    # Bifasicular heart block  - Cardiology follow - up with Dr. Lopez appreciated, last progress note on 8/7/2021 by Dr. Loza stated BB to be continued perioperatively and I called him to make sure that the patient can be on his metoprolol at 12.5 mg BID, which he was ok with       RODERICK Lyons  Department: Neuro ICU  Please feel free to contact for any questions or concerns. Thank you. Case discussed with Neurocritical Care attending. Please see attending attestation below.  Extension: [phone]

## 2021-08-13 RX ORDER — BACITRACIN ZINC 500 UNIT/G
1 OINTMENT IN PACKET (EA) TOPICAL THREE TIMES A DAY
Refills: 0 | Status: DISCONTINUED | OUTPATIENT
Start: 2021-08-13 | End: 2021-08-15

## 2021-08-13 RX ADMIN — POLYETHYLENE GLYCOL 3350 17 GRAM(S): 17 POWDER, FOR SOLUTION ORAL at 05:16

## 2021-08-13 RX ADMIN — Medication 2 MILLIGRAM(S): at 11:35

## 2021-08-13 RX ADMIN — METHOCARBAMOL 750 MILLIGRAM(S): 500 TABLET, FILM COATED ORAL at 23:18

## 2021-08-13 RX ADMIN — METHOCARBAMOL 750 MILLIGRAM(S): 500 TABLET, FILM COATED ORAL at 17:19

## 2021-08-13 RX ADMIN — Medication 2 MILLIGRAM(S): at 01:51

## 2021-08-13 RX ADMIN — Medication 2 MILLIGRAM(S): at 17:19

## 2021-08-13 RX ADMIN — POLYETHYLENE GLYCOL 3350 17 GRAM(S): 17 POWDER, FOR SOLUTION ORAL at 17:20

## 2021-08-13 RX ADMIN — HEPARIN SODIUM 5000 UNIT(S): 5000 INJECTION INTRAVENOUS; SUBCUTANEOUS at 05:15

## 2021-08-13 RX ADMIN — Medication 81 MILLIGRAM(S): at 11:35

## 2021-08-13 RX ADMIN — OXYCODONE HYDROCHLORIDE 5 MILLIGRAM(S): 5 TABLET ORAL at 22:26

## 2021-08-13 RX ADMIN — Medication 1 TABLET(S): at 11:35

## 2021-08-13 RX ADMIN — Medication 2 MILLIGRAM(S): at 05:15

## 2021-08-13 RX ADMIN — CHLORHEXIDINE GLUCONATE 1 APPLICATION(S): 213 SOLUTION TOPICAL at 05:15

## 2021-08-13 RX ADMIN — METHOCARBAMOL 750 MILLIGRAM(S): 500 TABLET, FILM COATED ORAL at 01:51

## 2021-08-13 RX ADMIN — Medication 12.5 MILLIGRAM(S): at 05:16

## 2021-08-13 RX ADMIN — Medication 12.5 MILLIGRAM(S): at 17:24

## 2021-08-13 RX ADMIN — HEPARIN SODIUM 5000 UNIT(S): 5000 INJECTION INTRAVENOUS; SUBCUTANEOUS at 17:19

## 2021-08-13 RX ADMIN — METHOCARBAMOL 750 MILLIGRAM(S): 500 TABLET, FILM COATED ORAL at 05:16

## 2021-08-13 RX ADMIN — METHOCARBAMOL 750 MILLIGRAM(S): 500 TABLET, FILM COATED ORAL at 12:01

## 2021-08-13 RX ADMIN — Medication 1 APPLICATION(S): at 17:26

## 2021-08-13 RX ADMIN — ATORVASTATIN CALCIUM 80 MILLIGRAM(S): 80 TABLET, FILM COATED ORAL at 22:19

## 2021-08-13 NOTE — PROGRESS NOTE ADULT - ASSESSMENT
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 Ann

## 2021-08-14 LAB — GLUCOSE BLDC GLUCOMTR-MCNC: 126 MG/DL — HIGH (ref 70–99)

## 2021-08-14 RX ORDER — HEPARIN SODIUM 5000 [USP'U]/ML
5000 INJECTION INTRAVENOUS; SUBCUTANEOUS EVERY 8 HOURS
Refills: 0 | Status: DISCONTINUED | OUTPATIENT
Start: 2021-08-14 | End: 2021-08-15

## 2021-08-14 RX ADMIN — Medication 2 MILLIGRAM(S): at 17:07

## 2021-08-14 RX ADMIN — HEPARIN SODIUM 5000 UNIT(S): 5000 INJECTION INTRAVENOUS; SUBCUTANEOUS at 05:24

## 2021-08-14 RX ADMIN — Medication 12.5 MILLIGRAM(S): at 17:07

## 2021-08-14 RX ADMIN — OXYCODONE HYDROCHLORIDE 5 MILLIGRAM(S): 5 TABLET ORAL at 05:23

## 2021-08-14 RX ADMIN — Medication 2 MILLIGRAM(S): at 05:28

## 2021-08-14 RX ADMIN — HEPARIN SODIUM 5000 UNIT(S): 5000 INJECTION INTRAVENOUS; SUBCUTANEOUS at 22:48

## 2021-08-14 RX ADMIN — ATORVASTATIN CALCIUM 80 MILLIGRAM(S): 80 TABLET, FILM COATED ORAL at 22:48

## 2021-08-14 RX ADMIN — METHOCARBAMOL 750 MILLIGRAM(S): 500 TABLET, FILM COATED ORAL at 17:07

## 2021-08-14 RX ADMIN — METHOCARBAMOL 750 MILLIGRAM(S): 500 TABLET, FILM COATED ORAL at 11:41

## 2021-08-14 RX ADMIN — Medication 12.5 MILLIGRAM(S): at 05:24

## 2021-08-14 RX ADMIN — HEPARIN SODIUM 5000 UNIT(S): 5000 INJECTION INTRAVENOUS; SUBCUTANEOUS at 17:07

## 2021-08-14 RX ADMIN — OXYCODONE HYDROCHLORIDE 5 MILLIGRAM(S): 5 TABLET ORAL at 20:00

## 2021-08-14 RX ADMIN — CHLORHEXIDINE GLUCONATE 1 APPLICATION(S): 213 SOLUTION TOPICAL at 05:28

## 2021-08-14 RX ADMIN — Medication 81 MILLIGRAM(S): at 11:41

## 2021-08-14 RX ADMIN — OXYCODONE HYDROCHLORIDE 5 MILLIGRAM(S): 5 TABLET ORAL at 19:34

## 2021-08-14 RX ADMIN — Medication 1 TABLET(S): at 11:41

## 2021-08-14 RX ADMIN — METHOCARBAMOL 750 MILLIGRAM(S): 500 TABLET, FILM COATED ORAL at 05:28

## 2021-08-14 RX ADMIN — POLYETHYLENE GLYCOL 3350 17 GRAM(S): 17 POWDER, FOR SOLUTION ORAL at 05:27

## 2021-08-14 NOTE — PROGRESS NOTE ADULT - ATTENDING COMMENTS
Agree with plan as above.
pt seen, examined, chart reviewed and assessment and plan d/w team
pt seen, examined, chart reviewed and assessment and plan d/w team in full

## 2021-08-15 ENCOUNTER — TRANSCRIPTION ENCOUNTER (OUTPATIENT)
Age: 76
End: 2021-08-15

## 2021-08-15 VITALS
HEART RATE: 75 BPM | TEMPERATURE: 98 F | RESPIRATION RATE: 18 BRPM | DIASTOLIC BLOOD PRESSURE: 68 MMHG | SYSTOLIC BLOOD PRESSURE: 149 MMHG

## 2021-08-15 RX ORDER — POLYETHYLENE GLYCOL 3350 17 G/17G
17 POWDER, FOR SOLUTION ORAL
Qty: 238 | Refills: 0
Start: 2021-08-15

## 2021-08-15 RX ORDER — BACITRACIN ZINC 500 UNIT/G
1 OINTMENT IN PACKET (EA) TOPICAL
Qty: 9 | Refills: 0
Start: 2021-08-15

## 2021-08-15 RX ORDER — OXYCODONE HYDROCHLORIDE 5 MG/1
1 TABLET ORAL
Qty: 30 | Refills: 0
Start: 2021-08-15

## 2021-08-15 RX ORDER — METHOCARBAMOL 500 MG/1
1 TABLET, FILM COATED ORAL
Qty: 30 | Refills: 0
Start: 2021-08-15

## 2021-08-15 RX ORDER — OXYCODONE HYDROCHLORIDE 5 MG/1
1 TABLET ORAL
Qty: 40 | Refills: 0
Start: 2021-08-15

## 2021-08-15 RX ADMIN — POLYETHYLENE GLYCOL 3350 17 GRAM(S): 17 POWDER, FOR SOLUTION ORAL at 06:40

## 2021-08-15 RX ADMIN — METHOCARBAMOL 750 MILLIGRAM(S): 500 TABLET, FILM COATED ORAL at 12:46

## 2021-08-15 RX ADMIN — Medication 12.5 MILLIGRAM(S): at 06:39

## 2021-08-15 RX ADMIN — HEPARIN SODIUM 5000 UNIT(S): 5000 INJECTION INTRAVENOUS; SUBCUTANEOUS at 06:38

## 2021-08-15 RX ADMIN — METHOCARBAMOL 750 MILLIGRAM(S): 500 TABLET, FILM COATED ORAL at 00:01

## 2021-08-15 RX ADMIN — Medication 2 MILLIGRAM(S): at 10:14

## 2021-08-15 RX ADMIN — Medication 1 TABLET(S): at 12:30

## 2021-08-15 RX ADMIN — HEPARIN SODIUM 5000 UNIT(S): 5000 INJECTION INTRAVENOUS; SUBCUTANEOUS at 13:06

## 2021-08-15 RX ADMIN — CHLORHEXIDINE GLUCONATE 1 APPLICATION(S): 213 SOLUTION TOPICAL at 06:40

## 2021-08-15 RX ADMIN — METHOCARBAMOL 750 MILLIGRAM(S): 500 TABLET, FILM COATED ORAL at 06:39

## 2021-08-15 RX ADMIN — Medication 81 MILLIGRAM(S): at 12:30

## 2021-08-15 RX ADMIN — OXYCODONE HYDROCHLORIDE 10 MILLIGRAM(S): 5 TABLET ORAL at 08:33

## 2021-08-15 RX ADMIN — Medication 650 MILLIGRAM(S): at 01:00

## 2021-08-15 NOTE — PROGRESS NOTE ADULT - SUBJECTIVE AND OBJECTIVE BOX
S/p T11-T12 Schwannoma Resection   POD #3    HPI:  Patient is a 76 year-old male PMH HTN and HLD on ASA-81 who is presenting for admission for T11-T12 schwannoma resection. Patient claims he has been having severe lower back pain that radiates around to his anterior abdomen since about March. Patient claims pain is worse when sitting and even worse when lying down. He was seen and examined at bedside in ED. Pt standing up for most of examination secondary to LBP while sitting. He is following all commands and endorses LBP with radiation around abdomen. Denies headaches, weakness, paresthesias, saddle anesthesia, incontinence, chest pain, or SOB.  (09 Aug 2021 18:23)    PAST MEDICAL & SURGICAL HISTORY:  HTN (hypertension)    HLD (hyperlipidemia)    FAMILY HISTORY:    Allergies  No Known Allergies    Intolerances    REVIEW OF SYSTEMS : All systems negative other than those listed in HPI  General:	-  Skin/Breast: -  Ophthalmologic: -   ENMT: -  Respiratory and Thorax: -  Cardiovascular: -	  Gastrointestinal: -  Genitourinary:-  Musculoskeletal:	-  Neurological:	-  Psychiatric:	-  Hematology/Lymphatics:	-  Endocrine:-  Allergic/Immunologic:	-    MEDICATIONS  (STANDING):  aspirin  chewable 81 milliGRAM(s) Oral daily  atorvastatin 80 milliGRAM(s) Oral at bedtime  chlorhexidine 4% Liquid 1 Application(s) Topical <User Schedule>  dexAMETHasone     Tablet   Oral   dexAMETHasone     Tablet 2 milliGRAM(s) Oral every 4 hours  dexAMETHasone     Tablet 2 milliGRAM(s) Oral every 6 hours  heparin   Injectable 5000 Unit(s) SubCutaneous every 12 hours  methocarbamol 750 milliGRAM(s) Oral every 6 hours  metoprolol tartrate 12.5 milliGRAM(s) Oral two times a day  multivitamin 1 Tablet(s) Oral daily  polyethylene glycol 3350 17 Gram(s) Oral every 12 hours    MEDICATIONS  (PRN):  acetaminophen   Tablet .. 650 milliGRAM(s) Oral every 6 hours PRN Temp greater or equal to 38C (100.4F), Mild Pain (1 - 3), Moderate Pain (4 - 6)  ondansetron Injectable 4 milliGRAM(s) IV Push every 6 hours PRN Nausea and/or Vomiting  oxyCODONE    IR 5 milliGRAM(s) Oral every 4 hours PRN Moderate Pain (4 - 6)  oxyCODONE    IR 10 milliGRAM(s) Oral every 4 hours PRN Severe Pain (7 - 10)  senna 2 Tablet(s) Oral at bedtime PRN Constipation    Anticoagulation:  aspirin  chewable 81 milliGRAM(s) Oral daily  heparin   Injectable 5000 Unit(s) SubCutaneous every 12 hours    OTHER:  atorvastatin 80 milliGRAM(s) Oral at bedtime  chlorhexidine 4% Liquid 1 Application(s) Topical <User Schedule>  dexAMETHasone     Tablet   Oral   dexAMETHasone     Tablet 2 milliGRAM(s) Oral every 4 hours  dexAMETHasone     Tablet 2 milliGRAM(s) Oral every 6 hours  metoprolol tartrate 12.5 milliGRAM(s) Oral two times a day  polyethylene glycol 3350 17 Gram(s) Oral every 12 hours  senna 2 Tablet(s) Oral at bedtime PRN    Vital Signs Last 24 Hrs  T(C): 36.7 (12 Aug 2021 08:00), Max: 36.7 (12 Aug 2021 08:00)  T(F): 98 (12 Aug 2021 08:00), Max: 98 (12 Aug 2021 08:00)  HR: 96 (12 Aug 2021 14:00) (62 - 110)  BP: 129/66 (12 Aug 2021 12:00) (92/43 - 134/61)  BP(mean): 85 (12 Aug 2021 12:00) (54 - 85)  RR: 26 (12 Aug 2021 14:00) (11 - 28)  SpO2: 97% (12 Aug 2021 14:00) (96% - 100%)    Physical Exam :  General :   A&O x 3   Tongue midline  Facial features symmetric, No droop  Speech clear and appropriate, no slur   Pt speaking in full sentences   Follows all commands   Occular :   PERRLA, no aniscoria or nystagmus  EOMI, no deviation or gaze preference   VA intact no diplopia   Motor :   MAEx4 b/l  strength 5/5  Shoulder shrug intact   Full ROM of neck   Sensory :  Intact bilaterally       LABS:                        12.8   16.20 )-----------( 181      ( 12 Aug 2021 06:35 )             36.7     08-12    140  |  105  |  18  ----------------------------<  131<H>  4.3   |  26  |  0.6<L>    Ca    8.2<L>      12 Aug 2021 06:35  Phos  2.3     08-12  Mg     2.1     08-12    TPro  4.8<L>  /  Alb  3.1<L>  /  TBili  0.6  /  DBili  x   /  AST  19  /  ALT  12  /  AlkPhos  46  08-11    PT/INR - ( 10 Aug 2021 17:20 )   PT: 13.50 sec;   INR: 1.18 ratio      PTT - ( 10 Aug 2021 17:20 )  PTT:21.9 sec    Assessment / Plan: Patient doing well sitting in bedside chair in good spirits   - Ok to downgrade to 3E / regular floor from neuro perspective  - f/u cardiology recs   - PT/OT/Rehab  - Continue ASA & SQH 
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 Patient successfully extubated postop and doing well ing PACU.  States he has mild incision pain, on PCA pump. Otherwise, preop pain of back pain radiating to abdomen has resolved. Patient denies any numbness / paresthesias, or radicular pain. Patient going to Neuro ICU for post op monitoring     MEDICATIONS  (STANDING):  acetaminophen   Tablet .. 975 milliGRAM(s) Oral every 4 hours  atorvastatin 80 milliGRAM(s) Oral at bedtime  ceFAZolin   IVPB 1000 milliGRAM(s) IV Intermittent every 8 hours  dexAMETHasone  Injectable 4 milliGRAM(s) IV Push every 6 hours  HYDROmorphone PCA (1 mG/mL) 30 milliLiter(s) PCA Continuous PCA Continuous  lactated ringers. 1000 milliLiter(s) (75 mL/Hr) IV Continuous <Continuous>  methocarbamol 750 milliGRAM(s) Oral every 6 hours  multivitamin 1 Tablet(s) Oral daily  pantoprazole    Tablet 40 milliGRAM(s) Oral before breakfast  sodium chloride 0.9%. 1000 milliLiter(s) (70 mL/Hr) IV Continuous <Continuous>    MEDICATIONS  (PRN):  ondansetron Injectable 4 milliGRAM(s) IV Push every 6 hours PRN Nausea and/or Vomiting  senna 2 Tablet(s) Oral at bedtime PRN Constipation                          12.2   11.26 )-----------( 163      ( 10 Aug 2021 17:20 )             34.7     08-09    138  |  101  |  21<H>  ----------------------------<  97  4.9   |  27  |  0.8    Ca    9.1      09 Aug 2021 19:55    TPro  6.6  /  Alb  4.7  /  TBili  1.4<H>  /  DBili  x   /  AST  23  /  ALT  16  /  AlkPhos  51  08-09    I&O's Summary    10 Aug 2021 07:01  -  10 Aug 2021 18:19  --------------------------------------------------------  IN: 140 mL / OUT: 130 mL / NET: 10 mL        PHYSICAL EXAM:  Awake, alert, following commands   PERRL EOMI   MAEx4 with good strength   B/L UE and LE 5/5   SILT   Incision - C/D/I    T(C): 36.3 (08-10-21 @ 18:00), Max: 36.8 (08-09-21 @ 21:54)  HR: 46 (08-10-21 @ 18:00) (44 - 99)  BP: 134/60 (08-10-21 @ 18:00) (106/57 - 156/72)  RR: 13 (08-10-21 @ 18:00) (11 - 20)  SpO2: 100% (08-10-21 @ 18:00) (97% - 100%)      
  POD# 4                  s/p T11 to T12 resection of schwannoma     pt seen and examined at bedside pt is alert, no complaints       Vital Signs Last 24 Hrs  T(C): 35.7 (15 Aug 2021 04:34), Max: 35.8 (14 Aug 2021 20:04)  T(F): 96.2 (15 Aug 2021 04:34), Max: 96.4 (14 Aug 2021 20:04)  HR: 65 (15 Aug 2021 04:34) (65 - 91)  BP: 150/74 (15 Aug 2021 06:35) (133/76 - 159/72)  BP(mean): 99 (15 Aug 2021 06:35) (99 - 104)  RR: 17 (15 Aug 2021 06:35) (17 - 18)  SpO2: 100% (15 Aug 2021 06:35) (100% - 100%)    PHYSICAL EXAM:    Alert, MAEX4   MS bilateral UE's 5/5        bilateral LE's 5/5   incision clean dry intact               some skin abrasions from tape       MEDICATIONS:  Antibiotics:    Neuro:  acetaminophen   Tablet .. 650 milliGRAM(s) Oral every 6 hours PRN  methocarbamol 750 milliGRAM(s) Oral every 6 hours  ondansetron Injectable 4 milliGRAM(s) IV Push every 6 hours PRN  oxyCODONE    IR 5 milliGRAM(s) Oral every 4 hours PRN  oxyCODONE    IR 10 milliGRAM(s) Oral every 4 hours PRN    Anticoagulation:  aspirin  chewable 81 milliGRAM(s) Oral daily  heparin   Injectable 5000 Unit(s) SubCutaneous every 8 hours    OTHER:  atorvastatin 80 milliGRAM(s) Oral at bedtime  BACItracin   Ointment 1 Application(s) Topical three times a day PRN  chlorhexidine 4% Liquid 1 Application(s) Topical <User Schedule>  metoprolol tartrate 12.5 milliGRAM(s) Oral two times a day  polyethylene glycol 3350 17 Gram(s) Oral every 12 hours  senna 2 Tablet(s) Oral at bedtime PRN    IVF:  multivitamin 1 Tablet(s) Oral daily    A/p             s/p T11 to T12 resection of schwannoma                   neuro stable                    DC home   
HPI:  Patient is a 76 year-old male PMH HTN and HLD on ASA-81 who is presenting for admission for T11-T12 schwannoma resection. Patient claims he has been having severe lower back pain that radiates around to his anterior abdomen since about March. Patient claims pain is worse when sitting and even worse when lying down. He was seen and examined at bedside in ED. Pt standing up for most of examination secondary to LBP while sitting. He is following all commands and endorses LBP with radiation around abdomen. Denies headaches, weakness, paresthesias, saddle anesthesia, incontinence, chest pain, or SOB.  (09 Aug 2021 18:23)        INTERVAL HPI: Patient underwent T11-12 Thoracic laminectomy for excision of intradural extramedullary neoplasm    Problem List:  BACK PAIN SCHWANNOMA    ^BACK PAIN SCHWANNOMA    Handoff    MEWS Score    HTN (hypertension)    HLD (hyperlipidemia)    Back pain    Thoracic laminectomy for excision of intradural extramedullary neoplasm    BACK PAIN    2    Schwannoma    ICU Vital Signs Last 24 Hrs  T(C): 36.1 (10 Aug 2021 16:54), Max: 36.8 (09 Aug 2021 21:54)  T(F): 96.9 (10 Aug 2021 16:54), Max: 98.2 (09 Aug 2021 21:54)  HR: 44 (10 Aug 2021 17:30) (44 - 99)  BP: 123/60 (10 Aug 2021 16:54) (106/57 - 156/72)  BP(mean): --  ABP: 129/45 (10 Aug 2021 17:30) (108/41 - 129/45)  ABP(mean): 73 (10 Aug 2021 17:30) (64 - 77)  RR: 11 (10 Aug 2021 17:30) (11 - 20)  SpO2: 100% (10 Aug 2021 17:30) (97% - 100%)      I&O's Summary    LABS:                        12.2   11.26 )-----------( 163      ( 10 Aug 2021 17:20 )             34.7     08-09    138  |  101  |  21<H>  ----------------------------<  97  4.9   |  27  |  0.8    Ca    9.1      09 Aug 2021 19:55    TPro  6.6  /  Alb  4.7  /  TBili  1.4<H>  /  DBili  x   /  AST  23  /  ALT  16  /  AlkPhos  51  08-09    PT/INR - ( 09 Aug 2021 19:55 )   PT: 11.60 sec;   INR: 1.01 ratio         PTT - ( 09 Aug 2021 19:55 )  PTT:27.9 sec    CAPILLARY BLOOD GLUCOSE      RADIOLOGY & ADDITIONAL TESTS:        Consultant(s) Notes Reviewed:  [x ] YES  [ ] NO    MEDICATIONS  (STANDING):  atorvastatin 80 milliGRAM(s) Oral at bedtime  ceFAZolin   IVPB 1000 milliGRAM(s) IV Intermittent every 8 hours  dexAMETHasone  Injectable 4 milliGRAM(s) IV Push every 6 hours  HYDROmorphone PCA (1 mG/mL) 30 milliLiter(s) PCA Continuous PCA Continuous  lactated ringers. 1000 milliLiter(s) (75 mL/Hr) IV Continuous <Continuous>  methocarbamol 750 milliGRAM(s) Oral every 6 hours  metoprolol tartrate 12.5 milliGRAM(s) Oral every 12 hours  multivitamin 1 Tablet(s) Oral daily  pantoprazole    Tablet 40 milliGRAM(s) Oral before breakfast  sodium chloride 0.9%. 1000 milliLiter(s) (70 mL/Hr) IV Continuous <Continuous>    MEDICATIONS  (PRN):  acetaminophen   Tablet .. 650 milliGRAM(s) Oral every 6 hours PRN Temp greater or equal to 38C (100.4F), Mild Pain (1 - 3)  ondansetron Injectable 4 milliGRAM(s) IV Push every 6 hours PRN Nausea and/or Vomiting  senna 2 Tablet(s) Oral at bedtime PRN Constipation      PHYSICAL EXAM:  GENERAL: well built, well nourished  HEAD:  Atraumatic, Normocephalic  EYES: EOMI, PERRLA, conjunctiva and sclera clear  ENT: No tonsillar erythema, exudates, or enlargement; Moist mucous membranes, Good dentition, No lesions  NECK: Supple, No JVD, Normal thyroid, no enlarged nodes  CHEST/LUNG: B/L good air entry; No rales, rhonchi, or wheezing  HEART: S1S2 normal, no S3, Regular rate and rhythm; No murmurs, rubs, or gallops  ABDOMEN: Soft, Nontender, Nondistended; Bowel sounds present  EXTREMITIES:  2+ Peripheral Pulses, No clubbing, cyanosis, or edema  LYMPH: No lymphadenopathy noted  SKIN: No rashes or lesions      Neurological:  Mental Status: Alert and Oriented to person, place, and time, cooperative, pleasant. Affect appropriate, thought, speech, and language coherent.   Cranial Nerves:  I: Deferred  II, III, IV, VI: 3 mm PERRLA, EOM intact. Araujo intact by confrontation. No cranial nerve palsy or nystagmus noted.  V: facial sensation intact; masseter/ temporal muscles intact, corneal reflex intact bilaterally  VII: face symmetrical at rest and with expression  VIII: Deferred but appears grossly intact  IX and X: +gag/cough  XI: Deferred  XII: no dysarthria, tongue weakness/fasiculation     Motor: Normal muscle bulk/tone. Good posture. Strength 5+/5+ upper & 5+/5+ lower extremities  Sensory: Sensation to light touch and noxious stimuli throughout. - numbness, parasthesia, or abnormal sensation upon exam.   Cerebellar Function: Pronator drift negative, no dymetria on FNF or shin-knee test  Reflexes: No clonus        Care Discussed with Consultants/Other Providers [ x] YES  [ ] NO
Hospital Course:   POD# 4 s/p T11-T12 schwannoma resection      Vital Signs Last 24 Hrs  T(C): 35.8 (14 Aug 2021 05:14), Max: 36.3 (13 Aug 2021 13:16)  T(F): 96.4 (14 Aug 2021 05:14), Max: 97.4 (13 Aug 2021 13:16)  HR: 64 (14 Aug 2021 05:14) (64 - 72)  BP: 167/70 (14 Aug 2021 05:14) (128/70 - 167/70)  BP(mean): 101 (14 Aug 2021 05:14) (87 - 101)  RR: 18 (14 Aug 2021 05:14) (18 - 18)  SpO2: --    I&O's Detail    13 Aug 2021 07:01  -  14 Aug 2021 07:00  --------------------------------------------------------  IN:  Total IN: 0 mL    OUT:    Voided (mL): 700 mL  Total OUT: 700 mL    Total NET: -700 mL        I&O's Summary    13 Aug 2021 07:01  -  14 Aug 2021 07:00  --------------------------------------------------------  IN: 0 mL / OUT: 700 mL / NET: -700 mL        PHYSICAL EXAM:  Neurological: alert x 3, NAD, mild pain, ambulating, no leg pain, denies numbness, GARNETT with 5/5 strength      Incision/Wound: dry, blister healing, no swelling, sutures intact        LABS:      CAPILLARY BLOOD GLUCOSE      POCT Blood Glucose.: 126 mg/dL (14 Aug 2021 06:45)      Drug Levels: [] N/A    CSF Analysis: [] N/A      Allergies    No Known Allergies    Intolerances      MEDICATIONS:  Antibiotics:    Neuro:  acetaminophen   Tablet .. 650 milliGRAM(s) Oral every 6 hours PRN  methocarbamol 750 milliGRAM(s) Oral every 6 hours  ondansetron Injectable 4 milliGRAM(s) IV Push every 6 hours PRN  oxyCODONE    IR 5 milliGRAM(s) Oral every 4 hours PRN  oxyCODONE    IR 10 milliGRAM(s) Oral every 4 hours PRN    Anticoagulation:  aspirin  chewable 81 milliGRAM(s) Oral daily  heparin   Injectable 5000 Unit(s) SubCutaneous every 12 hours    OTHER:  atorvastatin 80 milliGRAM(s) Oral at bedtime  BACItracin   Ointment 1 Application(s) Topical three times a day PRN  chlorhexidine 4% Liquid 1 Application(s) Topical <User Schedule>  dexAMETHasone     Tablet   Oral   dexAMETHasone     Tablet 2 milliGRAM(s) Oral every 12 hours  metoprolol tartrate 12.5 milliGRAM(s) Oral two times a day  polyethylene glycol 3350 17 Gram(s) Oral every 12 hours  senna 2 Tablet(s) Oral at bedtime PRN    IVF:  multivitamin 1 Tablet(s) Oral daily    CULTURES:    RADIOLOGY & ADDITIONAL TESTS:      ASSESSMENT:  76y Male s/p resection of schwannoma T11-12    BACK PAIN SCHWANNOMA    ^BACK PAIN SCHWANNOMA    Handoff    MEWS Score    HTN (hypertension)    HLD (hyperlipidemia)    Back pain    Thoracic laminectomy for excision of intradural extramedullary neoplasm    BACK PAIN    2    Schwannoma    [email or web address]        PLAN: ambulate, PT, discharge planning  
HPI:  Patient is a 76 year-old male PMH HTN and HLD on ASA-81 who is presenting for admission for T11-T12 schwannoma resection. Patient claims he has been having severe lower back pain that radiates around to his anterior abdomen since about March. Patient claims pain is worse when sitting and even worse when lying down. He was seen and examined at bedside in ED. Pt standing up for most of examination secondary to LBP while sitting. He is following all commands and endorses LBP with radiation around abdomen. Denies headaches, weakness, paresthesias, saddle anesthesia, incontinence, chest pain, or SOB.  (09 Aug 2021 18:23)    INTERVAL HPI: POD #1 T11-12 Thoracic laminectomy for excision of intradural extramedullary neoplasm. No acute events overnight    Problem List:  BACK PAIN SCHWANNOMA  ^BACK PAIN SCHWANNOMA    Past Medical and Surgical History:  HTN (hypertension)  HLD (hyperlipidemia)  Back pain  Thoracic laminectomy for excision of intradural extramedullary neoplasm  BACK PAIN 2/2 Schwannoma    ICU Vital Signs Last 24 Hrs:  T(C): 35.6 (11 Aug 2021 08:00), Max: 36.3 (10 Aug 2021 18:00)  T(F): 96 (11 Aug 2021 08:00), Max: 97.4 (11 Aug 2021 00:00)  HR: 100 (11 Aug 2021 11:00) (44 - 100)  BP: 134/60 (10 Aug 2021 18:00) (106/57 - 134/63)  BP(mean): --  ABP: 120/46 (11 Aug 2021 11:00) (108/41 - 150/46)  ABP(mean): 68 (11 Aug 2021 11:00) (62 - 78)  RR: 21 (11 Aug 2021 11:00) (10 - 24)  SpO2: 99% (11 Aug 2021 11:00) (97% - 100%)      I&O's Summary  10 Aug 2021 07:01  -  11 Aug 2021 07:00  --------------------------------------------------------  IN: 1510 mL / OUT: 845 mL / NET: 665 mL    11 Aug 2021 07:01  -  11 Aug 2021 13:07  --------------------------------------------------------  IN: 210 mL / OUT: 50 mL / NET: 160 mL    Labs:  08-11    141  |  107  |  18  ----------------------------<  113<H>  4.3   |  26  |  0.6<L>    Ca    8.2<L>      11 Aug 2021 00:45  Phos  4.8     08-11  Mg     1.9     08-11    TPro  4.9<L>  /  Alb  3.4<L>  /  TBili  1.2  /  DBili  x   /  AST  20  /  ALT  13  /  AlkPhos  42  08-11                        11.9   15.40 )-----------( 180      ( 11 Aug 2021 00:45 )             34.3     PT/INR - ( 10 Aug 2021 17:20 )   PT: 13.50 sec;   INR: 1.18 ratio    PTT - ( 10 Aug 2021 17:20 )  PTT:21.9 sec    Medications:  MEDICATIONS  (STANDING):  acetaminophen   Tablet .. 650 milliGRAM(s) Oral every 6 hours  aspirin  chewable 81 milliGRAM(s) Oral daily  atorvastatin 80 milliGRAM(s) Oral at bedtime  ceFAZolin   IVPB 1000 milliGRAM(s) IV Intermittent every 8 hours  chlorhexidine 4% Liquid 1 Application(s) Topical <User Schedule>  dexAMETHasone  Injectable 4 milliGRAM(s) IV Push every 6 hours  heparin   Injectable 5000 Unit(s) SubCutaneous every 12 hours  methocarbamol 750 milliGRAM(s) Oral every 6 hours  multivitamin 1 Tablet(s) Oral daily  polyethylene glycol 3350 17 Gram(s) Oral every 12 hours    MEDICATIONS  (PRN):  ondansetron Injectable 4 milliGRAM(s) IV Push every 6 hours PRN Nausea and/or Vomiting  oxyCODONE    IR 5 milliGRAM(s) Oral every 4 hours PRN Moderate Pain (4 - 6)  oxyCODONE    IR 10 milliGRAM(s) Oral every 4 hours PRN Severe Pain (7 - 10)  senna 2 Tablet(s) Oral at bedtime PRN Constipation    PHYSICAL EXAM:  GENERAL: well built, well nourished male of appropriately stated age  HEAD:  Atraumatic, Normocephalic  EYES: EOMI, PERRLA, conjunctiva and sclera clear  ENT: No tonsillar erythema, exudates, or enlargement; Moist mucous membranes, Good dentition, No lesions  NECK: Supple, No JVD, Normal thyroid, no enlarged nodes  CHEST/LUNG: B/L good air entry; Lungs CTA; No rales, rhonchi, or wheezing  HEART: S1S2 normal, no S3, Regular rate and rhythm; No murmurs, rubs, or gallops  ABDOMEN: Soft, Nontender, Nondistended; Bowel sounds present  EXTREMITIES:  2+ Peripheral Pulses, No clubbing, cyanosis, or edema  LYMPH: No lymphadenopathy noted  SKIN: No rashes or lesions  Spinal dressing sanguinous drainage, dried with no evidence of infection    Neurological:  Mental Status: Alert and Oriented to person, place, and time, cooperative, pleasant. Affect appropriate, thought, speech, and language coherent. No dysarthria, no aphasia  Cranial Nerves:  I: Deferred  II, III, IV, VI: 3 mm PERRLA, EOM intact. Araujo intact by confrontation. No cranial nerve palsy or nystagmus noted.  V: facial sensation intact; masseter/ temporal muscles intact, corneal reflex intact bilaterally  VII: face symmetrical at rest and with expression, with no loss of nasolabial folds  VIII: Deferred but appears grossly intact  IX and X: +gag/cough  XI: Deferred  XII: no dysarthria, tongue weakness/fasiculation   Motor: Normal muscle bulk/tone. Good posture. Strength 5+/5+ upper & 5+/5+ lower extremities  Sensory: Sensation to light touch and noxious stimuli throughout. - numbness, parasthesia, or abnormal sensation upon exam.   Cerebellar Function: Pronator drift negative, no dymetria on FNF or shin-knee test  Reflexes: No clonus      
NEUROSURGERY NOTE  WALDEMAR HICKMAN   08-13-21 @ 12:52    PAST 24HR EVENTS:  POD# 3 s/p T11 to T12 resection of schwannoma   Patient seen and examined at bedside. No acute events overnight.     HPI: 76y Male     PHYSICAL EXAM:  Vital Signs Last 24 Hrs  T(C): 35.8 (13 Aug 2021 05:40), Max: 36.3 (12 Aug 2021 16:30)  T(F): 96.5 (13 Aug 2021 05:40), Max: 97.3 (12 Aug 2021 16:30)  HR: 69 (13 Aug 2021 05:40) (69 - 110)  BP: 145/69 (13 Aug 2021 05:40) (127/63 - 145/69)  BP(mean): 99 (13 Aug 2021 05:40) (89 - 99)  RR: 18 (13 Aug 2021 05:40) (18 - 26)  SpO2: 97% (12 Aug 2021 14:00) (97% - 97%)    Awake, alert, following commands   PERRL, EOMI   MAEx4 with good strength   Sensation intact   Incision - C/D/I     MEDS:   acetaminophen   Tablet .. 650 milliGRAM(s) Oral every 6 hours PRN  aspirin  chewable 81 milliGRAM(s) Oral daily  atorvastatin 80 milliGRAM(s) Oral at bedtime  BACItracin   Ointment 1 Application(s) Topical three times a day PRN  chlorhexidine 4% Liquid 1 Application(s) Topical <User Schedule>  dexAMETHasone     Tablet   Oral   dexAMETHasone     Tablet 2 milliGRAM(s) Oral every 6 hours  heparin   Injectable 5000 Unit(s) SubCutaneous every 12 hours  methocarbamol 750 milliGRAM(s) Oral every 6 hours  metoprolol tartrate 12.5 milliGRAM(s) Oral two times a day  multivitamin 1 Tablet(s) Oral daily  ondansetron Injectable 4 milliGRAM(s) IV Push every 6 hours PRN  oxyCODONE    IR 5 milliGRAM(s) Oral every 4 hours PRN  oxyCODONE    IR 10 milliGRAM(s) Oral every 4 hours PRN  polyethylene glycol 3350 17 Gram(s) Oral every 12 hours  senna 2 Tablet(s) Oral at bedtime PRN    LABS:                        12.8   16.20 )-----------( 181      ( 12 Aug 2021 06:35 )             36.7     08-12    140  |  105  |  18  ----------------------------<  131<H>  4.3   |  26  |  0.6<L>    Ca    8.2<L>      12 Aug 2021 06:35  Phos  2.3     08-12  Mg     2.1     08-12    TPro  4.8<L>  /  Alb  3.1<L>  /  TBili  0.6  /  DBili  x   /  AST  19  /  ALT  12  /  AlkPhos  46  08-11    RADIOLOGY:     
  HPI:  Patient is a 76 year-old male PMH HTN and HLD on ASA-81 who is presenting for admission for T11-T12 schwannoma resection. Patient claims he has been having severe lower back pain that radiates around to his anterior abdomen since about March. Patient claims pain is worse when sitting and even worse when lying down. He was seen and examined at bedside in ED. Pt standing up for most of examination secondary to LBP while sitting. He is following all commands and endorses LBP with radiation around abdomen. Denies headaches, weakness, paresthesias, saddle anesthesia, incontinence, chest pain, or SOB.  (09 Aug 2021 18:23)      PAST MEDICAL & SURGICAL HISTORY:  HTN (hypertension)    HLD (hyperlipidemia)        FAMILY HISTORY:      Allergies    No Known Allergies    Intolerances        REVIEW OF SYSTEMS : All systems negative other than those listed in HPI  General:	-  Skin/Breast: -  Ophthalmologic: -   ENMT: -  Respiratory and Thorax: -  Cardiovascular: -	  Gastrointestinal: -  Genitourinary:-  Musculoskeletal:	-  Neurological:	-  Psychiatric:	-  Hematology/Lymphatics:	-  Endocrine:-  Allergic/Immunologic:	-    MEDICATIONS  (STANDING):  acetaminophen   Tablet .. 650 milliGRAM(s) Oral every 6 hours  atorvastatin 80 milliGRAM(s) Oral at bedtime  ceFAZolin   IVPB 1000 milliGRAM(s) IV Intermittent every 8 hours  chlorhexidine 4% Liquid 1 Application(s) Topical <User Schedule>  dexAMETHasone  Injectable 4 milliGRAM(s) IV Push every 6 hours  HYDROmorphone PCA (1 mG/mL) 30 milliLiter(s) PCA Continuous PCA Continuous  methocarbamol 750 milliGRAM(s) Oral every 6 hours  multivitamin 1 Tablet(s) Oral daily  pantoprazole    Tablet 40 milliGRAM(s) Oral before breakfast  sodium chloride 0.9%. 1000 milliLiter(s) (70 mL/Hr) IV Continuous <Continuous>    MEDICATIONS  (PRN):  ondansetron Injectable 4 milliGRAM(s) IV Push every 6 hours PRN Nausea and/or Vomiting  senna 2 Tablet(s) Oral at bedtime PRN Constipation      Antibiotics:  ceFAZolin   IVPB 1000 milliGRAM(s) IV Intermittent every 8 hours      Anticoagulation:      OTHER:  atorvastatin 80 milliGRAM(s) Oral at bedtime  chlorhexidine 4% Liquid 1 Application(s) Topical <User Schedule>  dexAMETHasone  Injectable 4 milliGRAM(s) IV Push every 6 hours  pantoprazole    Tablet 40 milliGRAM(s) Oral before breakfast  senna 2 Tablet(s) Oral at bedtime PRN      IVF:      Vital Signs Last 24 Hrs  T(C): 35.6 (11 Aug 2021 08:00), Max: 36.3 (10 Aug 2021 18:00)  T(F): 96 (11 Aug 2021 08:00), Max: 97.4 (11 Aug 2021 00:00)  HR: 98 (11 Aug 2021 09:00) (44 - 98)  BP: 134/60 (10 Aug 2021 18:00) (106/57 - 134/63)  BP(mean): --  RR: 17 (11 Aug 2021 09:00) (10 - 24)  SpO2: 99% (11 Aug 2021 09:00) (97% - 100%)    Physical Exam :  General :   A&O x  Tongue midline  Facial features symmetric, No droop  Speech clear and appropriate, no slur   Pt speaking in full sentences   Follows all commands   Occular :   PERRLA, no aniscoria or nystagmus  EOMI, no deviation or gaze preference   VA intact no diplopia   Motor :   MAEx4 b/l  strength 5/5  Shoulder shrug intact   Full ROM of neck   No spinal point tenderness   Withdraws / Extends to painful stimuli  Sensory :  Intact bilaterally   Cerbellar :  LEONA intact  Finger to nose intact   Pronator Drift :   Hoffmans :     Drains / Devices :  Drainage / Output     LABS:                        11.9   15.40 )-----------( 180      ( 11 Aug 2021 00:45 )             34.3     08-11    141  |  107  |  18  ----------------------------<  113<H>  4.3   |  26  |  0.6<L>    Ca    8.2<L>      11 Aug 2021 00:45  Phos  4.8     08-11  Mg     1.9     08-11    TPro  4.9<L>  /  Alb  3.4<L>  /  TBili  1.2  /  DBili  x   /  AST  20  /  ALT  13  /  AlkPhos  42  08-11    PT/INR - ( 10 Aug 2021 17:20 )   PT: 13.50 sec;   INR: 1.18 ratio         PTT - ( 10 Aug 2021 17:20 )  PTT:21.9 sec    CULTURES:    RADIOLOGY & ADDITIONAL STUDIES:    Assessment / Plan: PT doing well worked with PT/OT today ambulating with contact guard.   - D/C PCA today and transition to PO pain medication  - Begin Steroid Taper today ; currently on Dec 4q6   - Resume ASA 81mg and SQH  - F/u troponin / EKG   - Cardiology recs about future stent placement

## 2021-08-15 NOTE — DISCHARGE NOTE PROVIDER - NSDCMRMEDTOKEN_GEN_ALL_CORE_FT
AMLOD/BENAZP CAP 5-1: 1 cap(s) orally once a day  aspirin 81 mg oral delayed release tablet: 1 tab(s) orally once a day   bacitracin 500 units/g topical ointment: 1 application topically 3 times a day, As needed, skin breakdown from dressing MDD:use as directed  methocarbamol 750 mg oral tablet: 1 tab(s) orally every 6 hours MDD:4  metoprolol tartrate 25 mg oral tablet: 0.5 tab(s) orally 2 times a day   oxyCODONE 10 mg oral tablet: 1 tab(s) orally every 4 hours, As needed, Severe Pain (7 - 10) MDD:6  oxyCODONE 5 mg oral tablet: 1 tab(s) orally every 4 hours, As needed, Moderate Pain (4 - 6) MDD:6  polyethylene glycol 3350 oral powder for reconstitution: 17 gram(s) orally every 12 hours MDD:use as directed

## 2021-08-15 NOTE — PROGRESS NOTE ADULT - PROVIDER SPECIALTY LIST ADULT
Neurosurgery
Critical Care
Neurosurgery
Critical Care
Neurosurgery
Neurosurgery

## 2021-08-15 NOTE — DISCHARGE NOTE PROVIDER - HOSPITAL COURSE
Patient is a 76 year-old male PMH HTN and HLD on ASA-81 who is presenting for admission for T11-T12 schwannoma resection. Patient claims he has been having severe lower back pain that radiates around to his anterior abdomen since about March. Patient claims pain is worse when sitting and even worse when lying down. He was seen and examined at bedside in ED. Pt standing up for most of examination secondary to LBP while sitting. He is following all commands and endorses LBP with radiation around abdomen. Denies headaches, weakness, paresthesias, saddle anesthesia, incontinence, chest pain, or SOB.   pt underwent the above procedure without any complications , pt went to the ICU post op . he was transferred to the floor POD # 2 , pt did well was seen by PT , did well and discharged home on POD #4

## 2021-08-15 NOTE — DISCHARGE NOTE PROVIDER - NSDCCPTREATMENT_GEN_ALL_CORE_FT
PRINCIPAL PROCEDURE  Procedure: Thoracic laminectomy for excision of intradural extramedullary neoplasm  Findings and Treatment:

## 2021-08-15 NOTE — PROGRESS NOTE ADULT - REASON FOR ADMISSION
T11-T12 schwannoma resection

## 2021-08-15 NOTE — DISCHARGE NOTE PROVIDER - CARE PROVIDER_API CALL
Michaelle Ann)  Neurosurgery  [address], [address]  [place] [address]  Phone: [phone]  Fax: [phone]  Follow Up Time:

## 2021-08-15 NOTE — DISCHARGE NOTE PROVIDER - NSDCFUADDINST_GEN_ALL_CORE_FT
Do not drive or drink alcohol while taking pain medications  May shower do not scrub incision site , or stand with your back to the shower head   Do not submerge in a bathtub or pool   may leave dressing off and incision open to air , put bacitracin on skin abrasions   call Dr Ann if you have worsening of symptoms , drainage from incision or fevers over 101

## 2021-08-15 NOTE — DISCHARGE NOTE PROVIDER - NSDCCPCAREPLAN_GEN_ALL_CORE_FT
PRINCIPAL DISCHARGE DIAGNOSIS  Diagnosis: Schwannoma  Assessment and Plan of Treatment:       SECONDARY DISCHARGE DIAGNOSES  Diagnosis: Schwannoma  Assessment and Plan of Treatment:

## 2021-08-15 NOTE — DISCHARGE NOTE NURSING/CASE MANAGEMENT/SOCIAL WORK - PATIENT PORTAL LINK FT
You can access the FollowMyHealth Patient Portal offered by [facility] by registering at the following website: [email or web address]. By joining [employer]’s FollowMyHealth portal, you will also be able to view your health information using other applications (apps) compatible with our system.

## 2021-08-23 ENCOUNTER — APPOINTMENT (OUTPATIENT)
Dept: NEUROSURGERY | Facility: CLINIC | Age: 76
End: 2021-08-23
Payer: MEDICARE

## 2021-08-23 VITALS — BODY MASS INDEX: 21.86 KG/M2 | WEIGHT: 136 LBS | HEIGHT: 66 IN

## 2021-08-23 PROCEDURE — 99024 POSTOP FOLLOW-UP VISIT: CPT

## 2021-08-24 NOTE — HISTORY OF PRESENT ILLNESS
[FreeTextEntry1] : s/p thoracic laminectomy and resection of intradural schwanoma.  Doing well.  Manageable pain, improved from preop, neuro intact.\par \par Wound clean dry and intact\par \par Stitches removed.\par \par Baci ointment\par Return in 2 weeks for wound check.\par Will order new baseline MRI at that visit

## 2021-08-30 NOTE — CDI QUERY NOTE - NSCDIOTHERTXTBX_GEN_ALL_CORE_HH
Documentation:  **  op report: Indication: large intradural extramedullary enhancing mass pressing the spinal cord.    Imagin/9 MRI: IMPRESSION: 2.8 cm intradural/extramedullary mass with probable intratumoral cysts at T11 level with right T11-12 extraforaminal extension, consistent the history of known spinal schwannoma. Associated severe spinal stenosis with left anterolateral displacement/compression of the spinal cord without significant cord signal abnormality.    QUERY:  Based on your clinical judgment and consideration of these clinical indicators, please clarify if mass pressing the spinal cord can be further specified as:  • Spinal cord compression.  • Other (please specify).  • Unable to determine.

## 2021-09-08 ENCOUNTER — APPOINTMENT (OUTPATIENT)
Dept: NEUROSURGERY | Facility: CLINIC | Age: 76
End: 2021-09-08
Payer: MEDICARE

## 2021-09-08 PROCEDURE — 99024 POSTOP FOLLOW-UP VISIT: CPT

## 2021-09-09 NOTE — REASON FOR VISIT
[FreeTextEntry1] : s/p thoracic laminectomy and stabilization for schwanoma resection.  Doing well.  No pain.\par Wound healed.\par \par PT\par MRI thoracic spine +/- for new baseline\par Return in 6 weeks.

## 2021-09-14 ENCOUNTER — NON-APPOINTMENT (OUTPATIENT)
Age: 76
End: 2021-09-14

## 2021-09-14 ENCOUNTER — APPOINTMENT (OUTPATIENT)
Dept: CARDIOLOGY | Facility: CLINIC | Age: 76
End: 2021-09-14
Payer: MEDICARE

## 2021-09-14 VITALS
HEIGHT: 66 IN | RESPIRATION RATE: 16 BRPM | OXYGEN SATURATION: 98 % | HEART RATE: 68 BPM | TEMPERATURE: 96.8 F | DIASTOLIC BLOOD PRESSURE: 70 MMHG | SYSTOLIC BLOOD PRESSURE: 120 MMHG | WEIGHT: 134 LBS | BODY MASS INDEX: 21.53 KG/M2

## 2021-09-14 DIAGNOSIS — Z78.9 OTHER SPECIFIED HEALTH STATUS: ICD-10-CM

## 2021-09-14 PROCEDURE — 93000 ELECTROCARDIOGRAM COMPLETE: CPT

## 2021-09-14 PROCEDURE — 99214 OFFICE O/P EST MOD 30 MIN: CPT

## 2021-09-14 NOTE — ASSESSMENT
[FreeTextEntry1] : 75 yo male with pmhx and presentation as above\par cad\par htn/dyslipidemia\par s/p schwannoma resection\par cont all meds\par high dose statin for 3 months, then back to reg dose\par cont asa and low dose bb\par will eventually need rca revascularization\par will wait untill ok by neurosx\par  aggressive risk modif\par act as tolerated\par rtc 2-3 months

## 2021-09-14 NOTE — PHYSICAL EXAM
[No Acute Distress] : no acute distress [Normal Venous Pressure] : normal venous pressure [No Carotid Bruit] : no carotid bruit [Normal S1, S2] : normal S1, S2 [No Rub] : no rub [No Gallop] : no gallop [Murmur] : murmur [Clear Lung Fields] : clear lung fields [Good Air Entry] : good air entry [No Respiratory Distress] : no respiratory distress  [Soft] : abdomen soft [Non Tender] : non-tender [No Masses/organomegaly] : no masses/organomegaly [Normal Bowel Sounds] : normal bowel sounds [Normal Gait] : normal gait [No Edema] : no edema [No Cyanosis] : no cyanosis [No Clubbing] : no clubbing [No Varicosities] : no varicosities [No Rash] : no rash [No Skin Lesions] : no skin lesions [Moves all extremities] : moves all extremities [No Focal Deficits] : no focal deficits [Normal Speech] : normal speech [Alert and Oriented] : alert and oriented [Normal memory] : normal memory [de-identified] : 2/6 syst m

## 2021-09-14 NOTE — HISTORY OF PRESENT ILLNESS
[FreeTextEntry1] : 75 yo male with pmhx as below is here for a f/up visit\par 8/21 s/p neuro sx\par pre op wup revealed severe 1 v cad - decided to proceed with sx without revasc\par no card complications post op\par seen by neuro sx, post op MRI pending\par back pain much better\par no major cvs complains\par et is getting better as well\par \par

## 2021-09-14 NOTE — REASON FOR VISIT
[Symptom and Test Evaluation] : symptom and test evaluation [CV Risk Factors and Non-Cardiac Disease] : CV risk factors and non-cardiac disease [Arrhythmia/ECG Abnorrmalities] : arrhythmia/ECG abnormalities [Hyperlipidemia] : hyperlipidemia [Hypertension] : hypertension [Coronary Artery Disease] : coronary artery disease

## 2021-10-15 ENCOUNTER — RESULT REVIEW (OUTPATIENT)
Age: 76
End: 2021-10-15

## 2021-10-15 ENCOUNTER — OUTPATIENT (OUTPATIENT)
Dept: OUTPATIENT SERVICES | Facility: HOSPITAL | Age: 76
LOS: 1 days | Discharge: HOME | End: 2021-10-15
Payer: MEDICARE

## 2021-10-15 DIAGNOSIS — D33.4 BENIGN NEOPLASM OF SPINAL CORD: ICD-10-CM

## 2021-10-15 PROCEDURE — 72157 MRI CHEST SPINE W/O & W/DYE: CPT | Mod: 26

## 2021-10-20 ENCOUNTER — APPOINTMENT (OUTPATIENT)
Dept: NEUROSURGERY | Facility: CLINIC | Age: 76
End: 2021-10-20
Payer: MEDICARE

## 2021-10-20 DIAGNOSIS — D33.4 BENIGN NEOPLASM OF SPINAL CORD: ICD-10-CM

## 2021-10-20 PROCEDURE — 99024 POSTOP FOLLOW-UP VISIT: CPT

## 2021-10-20 NOTE — HISTORY OF PRESENT ILLNESS
[FreeTextEntry1] : s/p schwanoma resection.  Doing well.  No pain.  Wound healed.\par \par MRI - GTR\par \par Repeat MRI in 6 months then in 1 year.\par \par May increase activity level

## 2021-11-30 RX ORDER — ATORVASTATIN CALCIUM 80 MG/1
1 TABLET, FILM COATED ORAL
Qty: 0 | Refills: 0 | DISCHARGE

## 2021-11-30 RX ORDER — ACETAMINOPHEN WITH CODEINE 300MG-30MG
0 TABLET ORAL
Qty: 0 | Refills: 0 | DISCHARGE

## 2021-11-30 RX ORDER — AMLODIPINE BESYLATE AND BENAZEPRIL HYDROCHLORIDE 10; 20 MG/1; MG/1
0 CAPSULE ORAL
Qty: 0 | Refills: 0 | DISCHARGE

## 2021-11-30 RX ORDER — ACETAMINOPHEN WITH CODEINE 300MG-30MG
1 TABLET ORAL
Qty: 0 | Refills: 0 | DISCHARGE

## 2021-11-30 RX ORDER — ATORVASTATIN CALCIUM 80 MG/1
0 TABLET, FILM COATED ORAL
Qty: 0 | Refills: 0 | DISCHARGE

## 2022-03-01 ENCOUNTER — RX RENEWAL (OUTPATIENT)
Age: 77
End: 2022-03-01

## 2022-05-14 PROBLEM — I10 ESSENTIAL (PRIMARY) HYPERTENSION: Chronic | Status: ACTIVE | Noted: 2021-08-06

## 2022-05-14 PROBLEM — E78.5 HYPERLIPIDEMIA, UNSPECIFIED: Chronic | Status: ACTIVE | Noted: 2021-08-06

## 2022-05-17 ENCOUNTER — APPOINTMENT (OUTPATIENT)
Dept: CARDIOLOGY | Facility: CLINIC | Age: 77
End: 2022-05-17
Payer: MEDICARE

## 2022-05-17 VITALS
SYSTOLIC BLOOD PRESSURE: 160 MMHG | OXYGEN SATURATION: 98 % | HEIGHT: 66 IN | DIASTOLIC BLOOD PRESSURE: 70 MMHG | TEMPERATURE: 97 F | HEART RATE: 48 BPM | RESPIRATION RATE: 16 BRPM | BODY MASS INDEX: 21.86 KG/M2 | WEIGHT: 136 LBS

## 2022-05-17 PROCEDURE — 93000 ELECTROCARDIOGRAM COMPLETE: CPT

## 2022-05-17 PROCEDURE — 99214 OFFICE O/P EST MOD 30 MIN: CPT

## 2022-05-17 NOTE — ASSESSMENT
[FreeTextEntry1] : 78 yo male with pmhx and presentation as above\par cad\par htn/dyslipidemia\par s/p schwannoma resection\par ecg with mobitz 1 with rbbb/lafb\par set up for mcot for 1 week\par cont asa and low dose statin\par avoid bb/ccb\par bp monitoring at home\par aggressive risk modif\par act as tolerated\par rtc after mcot

## 2022-05-17 NOTE — HISTORY OF PRESENT ILLNESS
[FreeTextEntry1] : 78 yo male with pmhx as below is here for a f/up visit\par 8/21 s/p neuro sx\par pre op wup revealed severe 1 v cad - decided to proceed with sx without revasc\par no card complications post op\par new onset near syncope\par hr high 30s/low 40s at home on few occasions \par back pain much better\par no major cvs complains\par et is stable\par \par
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
## 2022-05-17 NOTE — PHYSICAL EXAM
[No Acute Distress] : no acute distress [Normal Venous Pressure] : normal venous pressure [No Carotid Bruit] : no carotid bruit [Normal S1, S2] : normal S1, S2 [No Rub] : no rub [No Gallop] : no gallop [Murmur] : murmur [Clear Lung Fields] : clear lung fields [Good Air Entry] : good air entry [No Respiratory Distress] : no respiratory distress  [Soft] : abdomen soft [Non Tender] : non-tender [No Masses/organomegaly] : no masses/organomegaly [Normal Bowel Sounds] : normal bowel sounds [Normal Gait] : normal gait [No Edema] : no edema [No Cyanosis] : no cyanosis [No Clubbing] : no clubbing [No Varicosities] : no varicosities [No Rash] : no rash [No Skin Lesions] : no skin lesions [Moves all extremities] : moves all extremities [No Focal Deficits] : no focal deficits [Normal Speech] : normal speech [Alert and Oriented] : alert and oriented [Normal memory] : normal memory [de-identified] : 2/6 syst m [de-identified] : gaudencio

## 2022-05-31 ENCOUNTER — APPOINTMENT (OUTPATIENT)
Dept: CARDIOLOGY | Facility: CLINIC | Age: 77
End: 2022-05-31
Payer: MEDICARE

## 2022-05-31 PROCEDURE — 93228 REMOTE 30 DAY ECG REV/REPORT: CPT

## 2022-08-05 ENCOUNTER — NON-APPOINTMENT (OUTPATIENT)
Age: 77
End: 2022-08-05

## 2022-09-02 ENCOUNTER — RX RENEWAL (OUTPATIENT)
Age: 77
End: 2022-09-02

## 2022-12-05 NOTE — ED PROVIDER NOTE - CLINICAL SUMMARY MEDICAL DECISION MAKING FREE TEXT BOX
Letter has been mailed out    a/p; BP 2/2 Schwannoma, labs, ekg, swab, ad suha to Dr Ann/Neurosurgery service

## 2022-12-15 ENCOUNTER — INPATIENT (INPATIENT)
Facility: HOSPITAL | Age: 77
LOS: 1 days | Discharge: HOME HEALTH | End: 2022-12-17
Attending: STUDENT IN AN ORGANIZED HEALTH CARE EDUCATION/TRAINING PROGRAM | Admitting: STUDENT IN AN ORGANIZED HEALTH CARE EDUCATION/TRAINING PROGRAM
Payer: MEDICARE

## 2022-12-15 VITALS
SYSTOLIC BLOOD PRESSURE: 174 MMHG | HEART RATE: 79 BPM | TEMPERATURE: 97 F | OXYGEN SATURATION: 100 % | WEIGHT: 149.91 LBS | DIASTOLIC BLOOD PRESSURE: 70 MMHG | RESPIRATION RATE: 18 BRPM

## 2022-12-15 LAB
BASOPHILS # BLD AUTO: 0.04 K/UL — SIGNIFICANT CHANGE UP (ref 0–0.2)
BASOPHILS NFR BLD AUTO: 0.4 % — SIGNIFICANT CHANGE UP (ref 0–1)
EOSINOPHIL # BLD AUTO: 0.06 K/UL — SIGNIFICANT CHANGE UP (ref 0–0.7)
EOSINOPHIL NFR BLD AUTO: 0.6 % — SIGNIFICANT CHANGE UP (ref 0–8)
HCT VFR BLD CALC: 44.4 % — SIGNIFICANT CHANGE UP (ref 42–52)
HGB BLD-MCNC: 15.5 G/DL — SIGNIFICANT CHANGE UP (ref 14–18)
IMM GRANULOCYTES NFR BLD AUTO: 0.3 % — SIGNIFICANT CHANGE UP (ref 0.1–0.3)
LYMPHOCYTES # BLD AUTO: 1.98 K/UL — SIGNIFICANT CHANGE UP (ref 1.2–3.4)
LYMPHOCYTES # BLD AUTO: 19.9 % — LOW (ref 20.5–51.1)
MCHC RBC-ENTMCNC: 32.8 PG — HIGH (ref 27–31)
MCHC RBC-ENTMCNC: 34.9 G/DL — SIGNIFICANT CHANGE UP (ref 32–37)
MCV RBC AUTO: 94.1 FL — HIGH (ref 80–94)
MONOCYTES # BLD AUTO: 0.59 K/UL — SIGNIFICANT CHANGE UP (ref 0.1–0.6)
MONOCYTES NFR BLD AUTO: 5.9 % — SIGNIFICANT CHANGE UP (ref 1.7–9.3)
NEUTROPHILS # BLD AUTO: 7.24 K/UL — HIGH (ref 1.4–6.5)
NEUTROPHILS NFR BLD AUTO: 72.9 % — SIGNIFICANT CHANGE UP (ref 42.2–75.2)
NRBC # BLD: 0 /100 WBCS — SIGNIFICANT CHANGE UP (ref 0–0)
PLATELET # BLD AUTO: 244 K/UL — SIGNIFICANT CHANGE UP (ref 130–400)
RBC # BLD: 4.72 M/UL — SIGNIFICANT CHANGE UP (ref 4.7–6.1)
RBC # FLD: 12.4 % — SIGNIFICANT CHANGE UP (ref 11.5–14.5)
WBC # BLD: 9.94 K/UL — SIGNIFICANT CHANGE UP (ref 4.8–10.8)
WBC # FLD AUTO: 9.94 K/UL — SIGNIFICANT CHANGE UP (ref 4.8–10.8)

## 2022-12-15 PROCEDURE — 70450 CT HEAD/BRAIN W/O DYE: CPT | Mod: 26,MA

## 2022-12-15 PROCEDURE — 93010 ELECTROCARDIOGRAM REPORT: CPT

## 2022-12-15 PROCEDURE — 99285 EMERGENCY DEPT VISIT HI MDM: CPT | Mod: 25

## 2022-12-15 PROCEDURE — 71045 X-RAY EXAM CHEST 1 VIEW: CPT | Mod: 26

## 2022-12-15 PROCEDURE — 12001 RPR S/N/AX/GEN/TRNK 2.5CM/<: CPT

## 2022-12-15 RX ORDER — TETANUS TOXOID, REDUCED DIPHTHERIA TOXOID AND ACELLULAR PERTUSSIS VACCINE, ADSORBED 5; 2.5; 8; 8; 2.5 [IU]/.5ML; [IU]/.5ML; UG/.5ML; UG/.5ML; UG/.5ML
0.5 SUSPENSION INTRAMUSCULAR ONCE
Refills: 0 | Status: COMPLETED | OUTPATIENT
Start: 2022-12-15 | End: 2022-12-15

## 2022-12-15 RX ADMIN — TETANUS TOXOID, REDUCED DIPHTHERIA TOXOID AND ACELLULAR PERTUSSIS VACCINE, ADSORBED 0.5 MILLILITER(S): 5; 2.5; 8; 8; 2.5 SUSPENSION INTRAMUSCULAR at 23:48

## 2022-12-15 NOTE — ED ADULT TRIAGE NOTE - CHIEF COMPLAINT QUOTE
pt sts he probably had a syncopal episode because he doesn't remember what happens he just woke up in the floor. pt has a laceration to the back of the head. denies any vomiting, no blood thinners.

## 2022-12-16 ENCOUNTER — TRANSCRIPTION ENCOUNTER (OUTPATIENT)
Age: 77
End: 2022-12-16

## 2022-12-16 LAB
ALBUMIN SERPL ELPH-MCNC: 4.3 G/DL — SIGNIFICANT CHANGE UP (ref 3.5–5.2)
ALBUMIN SERPL ELPH-MCNC: 5.1 G/DL — SIGNIFICANT CHANGE UP (ref 3.5–5.2)
ALP SERPL-CCNC: 80 U/L — SIGNIFICANT CHANGE UP (ref 30–115)
ALP SERPL-CCNC: 84 U/L — SIGNIFICANT CHANGE UP (ref 30–115)
ALT FLD-CCNC: 16 U/L — SIGNIFICANT CHANGE UP (ref 0–41)
ALT FLD-CCNC: 20 U/L — SIGNIFICANT CHANGE UP (ref 0–41)
ANION GAP SERPL CALC-SCNC: 10 MMOL/L — SIGNIFICANT CHANGE UP (ref 7–14)
ANION GAP SERPL CALC-SCNC: 13 MMOL/L — SIGNIFICANT CHANGE UP (ref 7–14)
APTT BLD: 32.3 SEC — SIGNIFICANT CHANGE UP (ref 27–39.2)
AST SERPL-CCNC: 22 U/L — SIGNIFICANT CHANGE UP (ref 0–41)
AST SERPL-CCNC: 34 U/L — SIGNIFICANT CHANGE UP (ref 0–41)
BILIRUB SERPL-MCNC: 0.8 MG/DL — SIGNIFICANT CHANGE UP (ref 0.2–1.2)
BILIRUB SERPL-MCNC: 1.2 MG/DL — SIGNIFICANT CHANGE UP (ref 0.2–1.2)
BLD GP AB SCN SERPL QL: SIGNIFICANT CHANGE UP
BUN SERPL-MCNC: 14 MG/DL — SIGNIFICANT CHANGE UP (ref 10–20)
BUN SERPL-MCNC: 19 MG/DL — SIGNIFICANT CHANGE UP (ref 10–20)
CALCIUM SERPL-MCNC: 9.2 MG/DL — SIGNIFICANT CHANGE UP (ref 8.4–10.5)
CALCIUM SERPL-MCNC: 9.5 MG/DL — SIGNIFICANT CHANGE UP (ref 8.4–10.5)
CHLORIDE SERPL-SCNC: 103 MMOL/L — SIGNIFICANT CHANGE UP (ref 98–110)
CHLORIDE SERPL-SCNC: 105 MMOL/L — SIGNIFICANT CHANGE UP (ref 98–110)
CO2 SERPL-SCNC: 25 MMOL/L — SIGNIFICANT CHANGE UP (ref 17–32)
CO2 SERPL-SCNC: 27 MMOL/L — SIGNIFICANT CHANGE UP (ref 17–32)
CREAT SERPL-MCNC: 0.7 MG/DL — SIGNIFICANT CHANGE UP (ref 0.7–1.5)
CREAT SERPL-MCNC: 0.8 MG/DL — SIGNIFICANT CHANGE UP (ref 0.7–1.5)
EGFR: 90 ML/MIN/1.73M2 — SIGNIFICANT CHANGE UP
EGFR: 94 ML/MIN/1.73M2 — SIGNIFICANT CHANGE UP
GLUCOSE SERPL-MCNC: 92 MG/DL — SIGNIFICANT CHANGE UP (ref 70–99)
GLUCOSE SERPL-MCNC: 93 MG/DL — SIGNIFICANT CHANGE UP (ref 70–99)
HCT VFR BLD CALC: 42.2 % — SIGNIFICANT CHANGE UP (ref 42–52)
HGB BLD-MCNC: 14.7 G/DL — SIGNIFICANT CHANGE UP (ref 14–18)
INR BLD: 1.02 RATIO — SIGNIFICANT CHANGE UP (ref 0.65–1.3)
MAGNESIUM SERPL-MCNC: 2.1 MG/DL — SIGNIFICANT CHANGE UP (ref 1.8–2.4)
MAGNESIUM SERPL-MCNC: 2.2 MG/DL — SIGNIFICANT CHANGE UP (ref 1.8–2.4)
MCHC RBC-ENTMCNC: 32.6 PG — HIGH (ref 27–31)
MCHC RBC-ENTMCNC: 34.8 G/DL — SIGNIFICANT CHANGE UP (ref 32–37)
MCV RBC AUTO: 93.6 FL — SIGNIFICANT CHANGE UP (ref 80–94)
NRBC # BLD: 0 /100 WBCS — SIGNIFICANT CHANGE UP (ref 0–0)
NT-PROBNP SERPL-SCNC: 153 PG/ML — SIGNIFICANT CHANGE UP (ref 0–300)
PLATELET # BLD AUTO: 214 K/UL — SIGNIFICANT CHANGE UP (ref 130–400)
POTASSIUM SERPL-MCNC: 4.4 MMOL/L — SIGNIFICANT CHANGE UP (ref 3.5–5)
POTASSIUM SERPL-MCNC: 5.2 MMOL/L — HIGH (ref 3.5–5)
POTASSIUM SERPL-SCNC: 4.4 MMOL/L — SIGNIFICANT CHANGE UP (ref 3.5–5)
POTASSIUM SERPL-SCNC: 5.2 MMOL/L — HIGH (ref 3.5–5)
PROT SERPL-MCNC: 6.4 G/DL — SIGNIFICANT CHANGE UP (ref 6–8)
PROT SERPL-MCNC: 7.4 G/DL — SIGNIFICANT CHANGE UP (ref 6–8)
PROTHROM AB SERPL-ACNC: 11.6 SEC — SIGNIFICANT CHANGE UP (ref 9.95–12.87)
RBC # BLD: 4.51 M/UL — LOW (ref 4.7–6.1)
RBC # FLD: 12.5 % — SIGNIFICANT CHANGE UP (ref 11.5–14.5)
SARS-COV-2 RNA SPEC QL NAA+PROBE: SIGNIFICANT CHANGE UP
SODIUM SERPL-SCNC: 141 MMOL/L — SIGNIFICANT CHANGE UP (ref 135–146)
SODIUM SERPL-SCNC: 142 MMOL/L — SIGNIFICANT CHANGE UP (ref 135–146)
T4 AB SER-ACNC: 5.4 UG/DL — SIGNIFICANT CHANGE UP (ref 4.6–12)
TROPONIN T SERPL-MCNC: <0.01 NG/ML — SIGNIFICANT CHANGE UP
TROPONIN T SERPL-MCNC: <0.01 NG/ML — SIGNIFICANT CHANGE UP
TSH SERPL-MCNC: 1.53 UIU/ML — SIGNIFICANT CHANGE UP (ref 0.27–4.2)
TSH SERPL-MCNC: 1.56 UIU/ML — SIGNIFICANT CHANGE UP (ref 0.27–4.2)
WBC # BLD: 8.1 K/UL — SIGNIFICANT CHANGE UP (ref 4.8–10.8)
WBC # FLD AUTO: 8.1 K/UL — SIGNIFICANT CHANGE UP (ref 4.8–10.8)

## 2022-12-16 PROCEDURE — 99283 EMERGENCY DEPT VISIT LOW MDM: CPT

## 2022-12-16 PROCEDURE — 71045 X-RAY EXAM CHEST 1 VIEW: CPT | Mod: 26

## 2022-12-16 PROCEDURE — 70450 CT HEAD/BRAIN W/O DYE: CPT | Mod: 26

## 2022-12-16 PROCEDURE — 93306 TTE W/DOPPLER COMPLETE: CPT | Mod: 26

## 2022-12-16 PROCEDURE — 99223 1ST HOSP IP/OBS HIGH 75: CPT | Mod: 57

## 2022-12-16 PROCEDURE — 99223 1ST HOSP IP/OBS HIGH 75: CPT

## 2022-12-16 PROCEDURE — 33208 INSRT HEART PM ATRIAL & VENT: CPT

## 2022-12-16 RX ORDER — ATORVASTATIN CALCIUM 80 MG/1
1 TABLET, FILM COATED ORAL
Qty: 0 | Refills: 0 | DISCHARGE

## 2022-12-16 RX ORDER — AMLODIPINE BESYLATE AND BENAZEPRIL HYDROCHLORIDE 10; 20 MG/1; MG/1
1 CAPSULE ORAL
Qty: 0 | Refills: 0 | DISCHARGE

## 2022-12-16 RX ORDER — CEFAZOLIN SODIUM 1 G
1000 VIAL (EA) INJECTION ONCE
Refills: 0 | Status: COMPLETED | OUTPATIENT
Start: 2022-12-16 | End: 2022-12-16

## 2022-12-16 RX ORDER — ATROPINE SULFATE 0.1 MG/ML
1 SYRINGE (ML) INJECTION ONCE
Refills: 0 | Status: DISCONTINUED | OUTPATIENT
Start: 2022-12-16 | End: 2022-12-16

## 2022-12-16 RX ORDER — LISINOPRIL 2.5 MG/1
1 TABLET ORAL
Qty: 0 | Refills: 0 | DISCHARGE
Start: 2022-12-16

## 2022-12-16 RX ORDER — CEFAZOLIN SODIUM 1 G
2000 VIAL (EA) INJECTION ONCE
Refills: 0 | Status: COMPLETED | OUTPATIENT
Start: 2022-12-16 | End: 2022-12-16

## 2022-12-16 RX ORDER — ASPIRIN/CALCIUM CARB/MAGNESIUM 324 MG
81 TABLET ORAL DAILY
Refills: 0 | Status: DISCONTINUED | OUTPATIENT
Start: 2022-12-16 | End: 2022-12-16

## 2022-12-16 RX ORDER — ATORVASTATIN CALCIUM 80 MG/1
20 TABLET, FILM COATED ORAL AT BEDTIME
Refills: 0 | Status: DISCONTINUED | OUTPATIENT
Start: 2022-12-16 | End: 2022-12-17

## 2022-12-16 RX ORDER — LISINOPRIL 2.5 MG/1
10 TABLET ORAL DAILY
Refills: 0 | Status: DISCONTINUED | OUTPATIENT
Start: 2022-12-16 | End: 2022-12-17

## 2022-12-16 RX ORDER — AMLODIPINE BESYLATE 2.5 MG/1
5 TABLET ORAL DAILY
Refills: 0 | Status: DISCONTINUED | OUTPATIENT
Start: 2022-12-16 | End: 2022-12-17

## 2022-12-16 RX ORDER — CEFAZOLIN SODIUM 1 G
1000 VIAL (EA) INJECTION EVERY 8 HOURS
Refills: 0 | Status: COMPLETED | OUTPATIENT
Start: 2022-12-16 | End: 2022-12-17

## 2022-12-16 RX ADMIN — Medication 100 MILLIGRAM(S): at 18:00

## 2022-12-16 RX ADMIN — ATORVASTATIN CALCIUM 20 MILLIGRAM(S): 80 TABLET, FILM COATED ORAL at 21:41

## 2022-12-16 RX ADMIN — Medication 100 MILLIGRAM(S): at 18:57

## 2022-12-16 RX ADMIN — LISINOPRIL 10 MILLIGRAM(S): 2.5 TABLET ORAL at 05:46

## 2022-12-16 NOTE — ED ADULT NURSE NOTE - NSIMPLEMENTINTERV_GEN_ALL_ED
Implemented All Fall Risk Interventions:  [place] to call system. Call bell, personal items and telephone within reach. Instruct patient to call for assistance. Room bathroom lighting operational. Non-slip footwear when patient is off stretcher. Physically safe environment: no spills, clutter or unnecessary equipment. Stretcher in lowest position, wheels locked, appropriate side rails in place. Provide visual cue, wrist band, yellow gown, etc. Monitor gait and stability. Monitor for mental status changes and reorient to person, place, and time. Review medications for side effects contributing to fall risk. Reinforce activity limits and safety measures with patient and family.

## 2022-12-16 NOTE — DISCHARGE NOTE PROVIDER - HOSPITAL COURSE
76 Y/O independent male with controlled HTN and HLD who presents to ED with daughter after syncopal episode. Patient reports he ran multiple errands today and was feeling well, came home and cooked, ate lunch, and after bringing his bowl to sink, syncopized. LOC +ve no urinary/fecal incontinence no tounge biting seizure like activity or post-ictal period. No reported chest pain or palpitations. Pt fell back onto tile floor sustaining small lac to posterior R scalp.  pt is followed by cardio, satish, for near syncope episodes for the last several months-year and had a monitor which demonstrated multiple periods of bradycardia 38-40's per daughter. no intervention was planned, just close monitoring. Denies fever/chill/HA/dizziness/chest pain/palpitation/sob/abd pain/n/v/d/ black stool/bloody stool/urinary symptoms.   patient was found to have Mobitz type II   patient underwent successful implantation of DC-Pacemaker (Medtronic). Patient was monitored overnight. On POD 1 patient remains HD stable with no complaints. Examination of (PPM) pocket is C/D/I with no hematoma or erythema. Device interrogation reveals normal device function and CXR was negative for pneumothorax. Patient is being DC home in stable conditon. 78 Y/O independent male with controlled HTN and HLD who presents to ED with daughter after syncopal episode. Patient reports he ran multiple errands today and was feeling well, came home and cooked, ate lunch, and after bringing his bowl to sink, syncopized. LOC +ve no urinary/fecal incontinence no tounge biting seizure like activity or post-ictal period. No reported chest pain or palpitations. Pt fell back onto tile floor sustaining small lac to posterior R scalp.  pt is followed by cardio, satish, for near syncope episodes for the last several months-year and had a monitor which demonstrated multiple periods of bradycardia 38-40's per daughter. no intervention was planned, just close monitoring. Denies fever/chill/HA/dizziness/chest pain/palpitation/sob/abd pain/n/v/d/ black stool/bloody stool/urinary symptoms.   patient was found to have Mobitz type II   patient underwent successful implantation of DC-Pacemaker (Medtronic). Patient was monitored overnight. On POD 1 patient remains HD stable with no complaints. Examination of (PPM) pocket is C/D/I with no hematoma or erythema. Device interrogation reveals normal device function and CXR was negative for pneumothorax. Patient is being DC home in stable conditon. 78 Y/O independent male with controlled HTN and HLD who presents to ED with daughter after syncopal episode. Patient reports he ran multiple errands today and was feeling well, came home and cooked, ate lunch, and after bringing his bowl to sink, syncopized. LOC +ve no urinary/fecal incontinence no tounge biting seizure like activity or post-ictal period. No reported chest pain or palpitations. Pt fell back onto tile floor sustaining small lac to posterior R scalp.  pt is followed by cardio, satish, for near syncope episodes for the last several months-year and had a monitor which demonstrated multiple periods of bradycardia 38-40's per daughter. no intervention was planned, just close monitoring. Denies fever/chill/HA/dizziness/chest pain/palpitation/sob/abd pain/n/v/d/ black stool/bloody stool/urinary symptoms.   patient was found to have Mobitz type II   patient underwent successful implantation of DC-Pacemaker (Medtronic). Patient was monitored overnight. On POD 1 patient remains HD stable with no complaints. Examination of (PPM) pocket is C/D/I with no hematoma or erythema. Device interrogation reveals normal device function and CXR was negative for pneumothorax. Patient is being DC home in stable conditon.   Physical Exam:  General: in no acute distress  Neck: suppler, no JVD  Lungs: CTA A/P b/l  Chest: left ACW + PPM dressing c/d/i  Abs: + BS, soft, NT/ND  Ext: no edema/cyanosis  Neuro: intact  A&O x 3 76 Y/O independent male with controlled HTN and HLD who presents to ED with daughter after syncopal episode. Patient reports he ran multiple errands today and was feeling well, came home and cooked, ate lunch, and after bringing his bowl to sink, syncopized. LOC +ve no urinary/fecal incontinence no tounge biting seizure like activity or post-ictal period. No reported chest pain or palpitations. Pt fell back onto tile floor sustaining small lac to posterior R scalp.  pt is followed by cardio, satish, for near syncope episodes for the last several months-year and had a monitor which demonstrated multiple periods of bradycardia 38-40's per daughter. no intervention was planned, just close monitoring. Denies fever/chill/HA/dizziness/chest pain/palpitation/sob/abd pain/n/v/d/ black stool/bloody stool/urinary symptoms.     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 Patient underwent successful implantation of DC-Pacemaker (Medtronic). Patient was monitored overnight. On POD 1 patient remains HD stable with no complaints. Examination of (PPM) pocket is C/D/I with no hematoma or erythema. Device interrogation reveals normal device function and CXR was negative for pneumothorax. Patient is being DC home in stable conditon.     Physical Exam:  General: in no acute distress  Neck: suppler, no JVD  Lungs: CTA A/P b/l  Chest: left ACW + PPM dressing c/d/i  Abs: + BS, soft, NT/ND  Ext: no edema/cyanosis  Neuro: intact  A&O x 3

## 2022-12-16 NOTE — DISCHARGE NOTE PROVIDER - ATTENDING DISCHARGE PHYSICAL EXAMINATION:
Left chest wall incision site is clean, dry, and intact with no hematoma or tenderness. Post-procedural instructions were reviewed.

## 2022-12-16 NOTE — ED PROVIDER NOTE - PROGRESS NOTE DETAILS
CT results rev'd with pt and daughter, neurosx c/s requested. will repair lac and admit to tele cardio c/s at pt presentation as daughter reports she spoke with satish who sts fellow to see pt in ED.

## 2022-12-16 NOTE — ED PROVIDER NOTE - NS ED ATTENDING STATEMENT MOD
This was a shared visit with the [facility]. I reviewed and verified the documentation and independently performed the documented:

## 2022-12-16 NOTE — H&P ADULT - NSHPREVIEWOFSYSTEMS_GEN_ALL_CORE
REVIEW OF SYSTEMS:    CONSTITUTIONAL: No weakness, fevers or chills  EYES/ENT: No visual changes;  No vertigo or throat pain   NECK: No pain or stiffness  RESPIRATORY: No cough, wheezing, hemoptysis; No shortness of breath  CARDIOVASCULAR: No chest pain or palpitations  GASTROINTESTINAL: No abdominal or epigastric pain. No nausea, vomiting, or hematemesis; No diarrhea or constipation. No melena or hematochezia.  GENITOURINARY: No dysuria, frequency or hematuria  NEUROLOGICAL: No numbness or weakness, presyncope and syncope  SKIN: No itching, rashes

## 2022-12-16 NOTE — ED PROVIDER NOTE - NS ED ROS FT
Constitutional: no fever, chills, no recent weight loss, change in appetite or malaise  Eyes: no redness/discharge/pain/vision changes  ENT: no rhinorrhea/ear pain/sore throat  Cardiac: No chest pain, SOB or edema.  Respiratory: No cough or respiratory distress  GI: No nausea, vomiting, diarrhea or abdominal pain.  : No dysuria, frequency, urgency or hematuria  MS: no pain to back or extremities, no loss of ROM, no weakness  Neuro: No headache or weakness.   Endocrine: No history of thyroid disease or diabetes.  Except as documented in the HPI, all other systems are negative.

## 2022-12-16 NOTE — DISCHARGE NOTE PROVIDER - NSDCCPCAREPLAN_GEN_ALL_CORE_FT
PRINCIPAL DISCHARGE DIAGNOSIS  Diagnosis: Syncope  Assessment and Plan of Treatment:       SECONDARY DISCHARGE DIAGNOSES  Diagnosis: Scalp laceration  Assessment and Plan of Treatment:      PRINCIPAL DISCHARGE DIAGNOSIS  Diagnosis: Syncope  Assessment and Plan of Treatment: diagnosed with mobitz type II AV block, underwnet PPM placement      SECONDARY DISCHARGE DIAGNOSES  Diagnosis: Scalp laceration  Assessment and Plan of Treatment:

## 2022-12-16 NOTE — ED PROVIDER NOTE - SECONDARY DIAGNOSIS.
Chief Complaint Well woman exam:  Annual Exam    She is established    Raiza Cherry is a 52 y.o. female  presents for a well woman exam.    No c/o Doing well   Saw Dr. Reynolds plastic surgeon and had implants removed with the left.  Recuperating well.  This was done in 2022.  Also saw Dr. NE Salgado for hair loss and is currently on Plaquenil  Has vaginal dryness with intercourse but currently is not in a relationship.  Does not want meds at this time.  Recommend coconut oil and to reach back out to me if she begins any relationship and would like vaginal estrogen or DHEA    Review of Systems -    No abdominal pain, No vaginal bleeding or discharge,   No breast pain or masses, No rectal bleeding     LMP: Patient's last menstrual period was 2018..    Meds per MD: none     Last Pap:  pap & hpv negative  Last MM2022 birads 1   Last Colonoscopy:     Past Medical History:   Diagnosis Date    Abnormal Pap smear of cervix     LEEP ,  LGSIL, colpo  mildly abnl cells,     Cataract     Chronic diarrhea     Glaucoma     Ulcerative colitis     proctitis       Past Surgical History:   Procedure Laterality Date    AUGMENTATION OF BREAST      implants were deflated by a doctor and will be removed 2022    BREAST SURGERY  ,     enlargement and lift of both breasts    BREAST SURGERY Bilateral 2022    implants removed    CATARACT EXTRACTION W/  INTRAOCULAR LENS IMPLANT Right 10/06/2021    DR.ELLEN BULLOCK    CATARACT EXTRACTION W/  INTRAOCULAR LENS IMPLANT Left     DR.ELLEN BULLOCK    CERVICAL BIOPSY  W/ LOOP ELECTRODE EXCISION  2016    Moderate dysplasia completely excised margins clear and ECC negative    COLONOSCOPY N/A 2022    Procedure: COLONOSCOPY;  Surgeon: Bran Breaux MD;  Location: [facility] ([address] [place] [address]);  Service: Endoscopy;  Laterality: N/A;  fully vaccinated 3/27/21, instructions sent to [email or web address]      COVID test at  "[place] on -GT    MASTOPEXY Bilateral 2022    Procedure: MASTOPEXY FULL WITH IMPLANT BAG REMOVAL;  Surgeon: Armen Reynolds Jr., MD;  Location: [facility];  Service: Plastics;  Laterality: Bilateral;  2HRS       OB History    Para Term  AB Living   3 3 3     3   SAB IAB Ectopic Multiple Live Births           3      # Outcome Date GA Lbr Jordon/2nd Weight Sex Delivery Anes PTL Lv   3 Term 10/24/07 40w0d  3.204 kg (7 lb 1 oz) M Vag-Spont EPI  KM   2 Term 96 40w0d  3.43 kg (7 lb 9 oz) F Vag-Spont EPI  KM   1 Term 04/15/93 40w0d  3.09 kg (6 lb 13 oz) F Vag-Spont EPI  KM      Obstetric Comments   Menarche ~ 11       Family History   Problem Relation Age of Onset    Lung disease Father     Glaucoma Father     Diabetes Mother     Heart disease Mother     Lupus Sister     Asthma Sister     No Known Problems Sister     No Known Problems Sister     No Known Problems Son     No Known Problems Daughter     No Known Problems Daughter     Breast cancer Neg Hx     Colon cancer Neg Hx     Ovarian cancer Neg Hx     Cancer Neg Hx     Amblyopia Neg Hx     Blindness Neg Hx     Macular degeneration Neg Hx     Retinal detachment Neg Hx     Strabismus Neg Hx        Social History     Tobacco Use    Smoking status: Never    Smokeless tobacco: Never   Substance Use Topics    Alcohol use: Yes     Comment: Rarely    Drug use: No         Physical Exam:  Ht 5' 5" (1.651 m)   Wt 53 kg (116 lb 13.5 oz)   LMP 2018   BMI 19.44 kg/m²     APPEARANCE: Well nourished, well developed, in no acute distress.  AFFECT: WNL, alert and oriented x 3  SKIN: No acne or hirsutism  BREASTS: Symmetrical, no skin changes.                     No nipple discharge.   No palpable masses bilaterally  NODES: No inguinal nor axillary LAD  ABDOMEN: soft Non tender Non distended No masses  PELVIC:   Normal external genitalia without lesions.   Normal urethral meatus.    No signif cystocele or rectocele.  Vagina atrophic without lesions " or discharge.    Cervix atrophic, without lesions, discharge or tenderness.    Bimanual exam shows uterus to be normal size, regular, mobile and nontender.    Adnexa without masses or tenderness.    EXTREMITIES: No edema.    ASSESSMENT AND PLAN    Raiza was seen today for annual exam.    Diagnoses and all orders for this visit:    Encounter for gynecological examination   PAP and MMG UTD   F/u one year       Patient was counseled today on A.C.S. Pap guidelines and recommendations for yearly pelvic exams, mammograms and monthly self breast exams; to see her PCP for other health maintenance.     Patient encouraged to register for portal and results will be sent via portal.    Follow up in about 1 year (around 11/3/2023) for annual.         Health Maintenance   Topic Date Due    Mammogram  08/02/2023    Lipid Panel  05/10/2027    TETANUS VACCINE  09/23/2031    Hepatitis C Screening  Completed                            Scalp laceration

## 2022-12-16 NOTE — H&P ADULT - HISTORY OF PRESENT ILLNESS
76 Y/O independent male with controlled HTN and HLD who presents to ED with daughter after syncopal episode. Patient reports he ran multiple errands today and was feeling well, came home and cooked, ate lunch, and after bringing his bowl to sink, syncopized. LOC +ve no urinary/fecal incontinence no tounge biting seizure like activity or post-ictal period. No reported chest pain or palpitations. Pt fell back onto tile floor sustaining small lac to posterior R scalp.  pt is followed by cardio, satish, for near syncope episodes for the last several months-year and had a monitor which demonstrated multiple periods of bradycardia 38-40's per daughter. no intervention was planned, just close monitoring. Denies fever/chill/HA/dizziness/chest pain/palpitation/sob/abd pain/n/v/d/ black stool/bloody stool/urinary sxs 78 Y/O independent male with controlled HTN and HLD who presents to ED with daughter after syncopal episode. Patient reports he ran multiple errands today and was feeling well, came home and cooked, ate lunch, and after bringing his bowl to sink, syncopized. LOC +ve, no urinary/fecal incontinence no tounge biting seizure like activity or post-ictal period. No reported chest pain or palpitations. Pt fell back onto tile floor sustaining small lac to posterior R scalp.  Pt is followed by cardio, Dr. Lopez, for near syncope episodes for the last several months-year and had a monitor which demonstrated multiple periods of bradycardia 38-40's per daughter. no intervention was planned, just close monitoring. Denies fever/chill/HA/dizziness/chest pain/palpitation/sob/abd pain/n/v/d/ black stool/bloody stool/urinary sxs

## 2022-12-16 NOTE — DISCHARGE NOTE PROVIDER - NSDCFUADDINST_GEN_ALL_CORE_FT
Your Pacemaker site is covered with Steri-strips. These should fall off in 2 weeks, if not you can gently pull them off after that time. Call your Electrophysiologist if your site is red, swollen, or has drainage, if you develop fever, or if your arm becomes swollen. No shower until follow up with EP doctor. No baths, swimming, strenuous activity, or driving for 1 month. Do not raise your Left arm above the level of your shoulder for 1 month.  - No heavy lifting > 5 lbs. for 4-6 weeks with Lt arm  - Do not raise your Lt arm above shoulder level for 4-6 weeks.   - Do not get site wet for 7 days  - No submerging in water for 1 month  - Leave steri-strips in place, they will fall off on their own  - No driving for 1 week  - Fu with EP office next week (office will call you to set up the appointment)

## 2022-12-16 NOTE — ED PROVIDER NOTE - CLINICAL SUMMARY MEDICAL DECISION MAKING FREE TEXT BOX
Pt presented s/p syncope. Required ekg, labs, imaging. Was found to have ? 1 mm subdural hematoma vs artifact. Pt was evaluated by nsx. Will admit for further management.

## 2022-12-16 NOTE — DISCHARGE NOTE PROVIDER - PROVIDER TOKENS
PROVIDER:[TOKEN:[[id number]],FOLLOWUP:[2 weeks]] PROVIDER:[TOKEN:[[id number]],FOLLOWUP:[1 week]]

## 2022-12-16 NOTE — DISCHARGE NOTE PROVIDER - NSDCCPTREATMENT_GEN_ALL_CORE_FT
PRINCIPAL PROCEDURE  Procedure: Insertion, cardiac pacemaker, dual chamber  Findings and Treatment:

## 2022-12-16 NOTE — PATIENT PROFILE ADULT - FALL HARM RISK - HARM RISK INTERVENTIONS

## 2022-12-16 NOTE — ED PROVIDER NOTE - ATTENDING APP SHARED VISIT CONTRIBUTION OF CARE
I personally evaluated the patient. I reviewed the Resident’s or Physician Assistant’s note (as assigned above), and agree with the findings and plan except as documented in my note.  76 M with hx of bradycardia, HTN and HLD on ASA with complaints of syncope. Pt stated that he was out and about today and felt fine. Then states that he was home with his wife and the next thing he found himself on the floor. His wife came to his aide and after several seconds he "came to". + HT, no seziure line activity. Denies CP, SOB. VS reviewed, pt non-toxic appearing, NAD. Head ncat, MMM, pharyngeal exam w/o erythema, edema or exudates. B/l TM wnl. neck supple, normal ROM, normal s1s2 without any murmurs, Lungs CTAB with normal work of breathing. abd +BS, s/nd/nttp, extremities wnl, neuro exam grossly normal. + small area on the dried blood on the left parietal scalp. Plan is ekg, labs, imaging, meds as needed and reassess.

## 2022-12-16 NOTE — H&P ADULT - NSHPLABSRESULTS_GEN_ALL_CORE
15.5   9.94  )-----------( 244      ( 15 Dec 2022 23:21 )             44.4       12-15    141  |  103  |  19  ----------------------------<  93  5.2<H>   |  25  |  0.8    Ca    9.5      15 Dec 2022 23:21  Mg     2.2     12-15    TPro  7.4  /  Alb  5.1  /  TBili  0.8  /  DBili  x   /  AST  34  /  ALT  20  /  AlkPhos  84  12-15                      Lactate Trend      CARDIAC MARKERS ( 15 Dec 2022 23:21 )  x     / <0.01 ng/mL / x     / x     / x            CAPILLARY BLOOD GLUCOSE 15.5   9.94  )-----------( 244      ( 15 Dec 2022 23:21 )             44.4       12-15    141  |  103  |  19  ----------------------------<  93  5.2<H>   |  25  |  0.8    Ca    9.5      15 Dec 2022 23:21  Mg     2.2     12-15    TPro  7.4  /  Alb  5.1  /  TBili  0.8  /  DBili  x   /  AST  34  /  ALT  20  /  AlkPhos  84  12-15    CTH X2-REPEAT NO EVIDENCE OF ich    EKG-NSR, RBBB, TYPE 2 SECOND DEGREE HEART BLOCK (INT BY ME)  TELE: SINUS BRADYCARDIA (TRANSIENT)                      Lactate Trend      CARDIAC MARKERS ( 15 Dec 2022 23:21 )  x     / <0.01 ng/mL / x     / x     / x            CAPILLARY BLOOD GLUCOSE

## 2022-12-16 NOTE — ED PROVIDER NOTE - OBJECTIVE STATEMENT
pt is an overall healthy, independent male with controlled HTN and HLD who presents to ED with daughter after syncopal episode pta. reports he ran multiple errands today and was feeling well, came home and cooked, ate lunch, and after bringing his bowl to sink, syncopized. episode of LOC was brief without any reported seizure like activity, witnessed by wife. during episode, pt fell back onto tile floor sustaining small lac to posterior R scalp. unsure of last tdap. of note, pt is followed by cardio, satish, for near syncope episodes for the last several months-year and had a monitor which demonstrated multiple periods of bradycardia 38-40's per daughter. no intervention was planned, just close monitoring and pts last visit with him was 3 mo ago. Denies fever/chill/HA/dizziness/chest pain/palpitation/sob/abd pain/n/v/d/ black stool/bloody stool/urinary sxs

## 2022-12-16 NOTE — CONSULT NOTE ADULT - ASSESSMENT
76M Hx HTN, HLD s/p T11-T12 schwannoma resection now s/p syncopal episode and fall with CTH showing questionable hyperdensity 1mm, possibly reflecting artifact vs SDH     PLAN   - No acute neurosurgical intervention   - Recc rpt CTH in 6 hours   - Syncope work up   - No need for Keppra   - Cardiology cx   - Hold AC/AP for now until stability scan   - D/W attending 
78 Y/O independent male with controlled HTN and HLD who presents to ED with daughter after syncopal episode.    Syncope likely secondary to heart block  Sinus bradycardia with Mobitz Type II  Hx CAD to RCA  Hx HTN    - EKG shows NSR with Mobitz type II  - Heart block likely cause of syncopal episode  - Obtain TSH, Echo  - Hold all AV alexa blockade  - Telemetry monitoring  - Will likely need pacemaker vs Micra

## 2022-12-16 NOTE — H&P ADULT - ATTENDING COMMENTS
AS ABOVE POST MY EDITS    SEEN IN ER AND INTERVIEWED    EVENTS AS DESCRIBED    CARDIO FELLOW NOTE READ AND APPRECIATED    N/S CONSULT NOTED    PENDING PPM, CONT TELE

## 2022-12-16 NOTE — PACU DISCHARGE NOTE - COMMENTS
78 y/o M s/p implantation of PPM under MAC. No anesthesia related complications.     HR: 76  BP: 112/66  SpO2: 99%   RR: 20

## 2022-12-16 NOTE — DISCHARGE NOTE PROVIDER - CARE PROVIDER_API CALL
Cosme Kramer (MD)  Cardiac Electrophysiology; Cardiology; Internal Medicine  [address] [place] [address]  Phone: [phone]  Fax: [phone]  Follow Up Time: 2 weeks   Cosme Kramer (MD)  Cardiac Electrophysiology; Cardiology; Internal Medicine  [address] [place] [address]  Phone: [phone]  Fax: [phone]  Follow Up Time: 1 week

## 2022-12-16 NOTE — H&P ADULT - ASSESSMENT
78 Y/O independent male with controlled HTN and HLD who presents to ED with daughter after syncopal episode. Patient reports he ran multiple errands today and was feeling well, came home and cooked, ate lunch, and after bringing his bowl to sink, syncopized.    #Syncope   - Admit to telemetry  - Ddx: arrhythmogenic vs orthostatic  - Tele shows 1st degree AV block with PAC's vs type 2 av block F/U cardio  - CT head: showed possible SDH vs artifact  - Troponins: negative  - History, exam, and labs most consistent with cardiogenic  - Cardiology consult   - EP eval  - F/U Echo for aortic valve and LVEF evaluation  - F/U orthostatic blood pressure  - Fall prevention  - No AV alexa blockers  - Pacing pads on at all times   - Atropine at bedside    #Possible SDH vs artifact (see ct head)  - Neurosurgery following   - Repeat CT head at 5 am 6 hours after the last one   - Neurosurgery F/U    #HTN  - C/W amlodipine 5 qd  - Benzapril 10 QD   - Hold parameters for BP meds    #CAD  #DLD  - C/W Lipitor 20 po QD  - Holding ASA for now till SDH can be ruled out    DVT PPX: on hold for now pending repeat CT head  Diet: DASH  Dispo: acute 76 Y/O independent male with controlled HTN and HLD who presents to ED with daughter after syncopal episode. Patient reports he ran multiple errands today and was feeling well, came home and cooked, ate lunch, and after bringing his bowl to sink, syncopized.    #Syncope   - Admit to telemetry  - Ddx: arrhythmogenic vs orthostatic  - Tele shows 1st degree AV block with PAC's vs type 2 av block F/U cardio  - CT head: showed possible SDH vs artifact  - Troponins: negative  - History, exam, and labs most consistent with cardiogenic  - Cardiology consult   - EP eval  - F/U Echo for aortic valve and LVEF evaluation  - F/U orthostatic blood pressure  - Fall prevention  - No AV alexa blockers  - Pacing pads on at all times   - Atropine at bedside  - maintain Lytes WNL    #Possible SDH vs artifact (see ct head)  - Neurosurgery following   - Repeat CT head at 5 am 6 hours after the last one   - Neurosurgery F/U    #HTN  - C/W amlodipine 5 qd  - Benzapril 10 QD   - Hold parameters for BP meds    #CAD  #DLD  - C/W Lipitor 20 po QD  - Holding ASA for now till SDH can be ruled out    DVT PPX: on hold for now pending repeat CT head  Diet: NPO for now incase emergent procedure needed  Dispo: acute    Labs for 11 am 76 Y/O independent male with controlled HTN and HLD who presents to ED with daughter after syncopal episode. Patient reports he ran multiple errands today and was feeling well, came home and cooked, ate lunch, and after bringing his bowl to sink, syncopized.    #Syncope   - Admit to telemetry  - Ddx: arrhythmogenic vs orthostatic  - Tele shows 1st degree AV block with PAC's vs type 2 av block F/U cardio  - CT head: showed possible SDH vs artifact  - Troponins: negative  - History, exam, and labs most consistent with cardiogenic  - Cardiology consult   - EP eval  - F/U Echo for aortic valve and LVEF evaluation  - F/U orthostatic blood pressure  - Fall prevention  - No AV alexa blockers  - Pacing pads on at all times   - Atropine at bedside  - maintain Lytes WNL  - EP eval    #Possible SDH vs artifact (see ct head)  - Neurosurgery following   - Repeat CT head at 5 am 6 hours after the last one   - Neurosurgery F/U    #HTN  - C/W amlodipine 5 qd  - Benzapril 10 QD   - Hold parameters for BP meds    #CAD  #DLD  - C/W Lipitor 20 po QD  - Holding ASA for now till SDH can be ruled out    DVT PPX: on hold for now pending repeat CT head  Diet: NPO for now incase emergent procedure needed  Dispo: acute    Labs for 11 am 76 Y/O independent male with controlled HTN and HLD who presents to ED with daughter after syncopal episode. Patient reports he ran multiple errands today and was feeling well, came home and cooked, ate lunch, and after bringing his bowl to sink, syncopized.    #Syncope   - Admit to telemetry  - Ddx: arrhythmogenic vs orthostatic  - Tele shows 1st degree AV block with PAC's vs type 2 av block F/U cardio  - CT head: showed possible SDH vs artifact  - Troponins: negative  - History, exam, and labs most consistent with cardiogenic  - EP eval  - F/U Echo for aortic valve and LVEF evaluation  - F/U orthostatic blood pressure  - Fall prevention  - No AV alexa blockers  - Pacing pads on at all times   - Atropine at bedside  - maintain Lytes WNL  - EP eval    #Possible SDH vs artifact (see ct head)  - Neurosurgery following   - Repeat CT head at 5 am 6 hours after the last one   - Neurosurgery F/U    #HTN  - C/W amlodipine 5 qd  - Benzapril 10 QD   - Hold parameters for BP meds    #CAD  #DLD  - C/W Lipitor 20 po QD  - Holding ASA for now till SDH can be ruled out    DVT PPX: on hold for now pending repeat CT head  Diet: NPO for now incase emergent procedure needed  Dispo: acute    Labs for 11 am

## 2022-12-16 NOTE — DISCHARGE NOTE PROVIDER - NSDCMRMEDTOKEN_GEN_ALL_CORE_FT
amlodipine-benazepril 5 mg-10 mg oral capsule: 1 cap(s) orally once a day  aspirin 81 mg oral delayed release tablet: 1 tab(s) orally once a day   atorvastatin 20 mg oral tablet: 1 tab(s) orally once a day  lisinopril 10 mg oral tablet: 1 tab(s) orally once a day   acetaminophen 325 mg oral tablet: 2 tab(s) orally every 6 hours, As needed, Mild Pain (1 - 3)  amlodipine-benazepril 5 mg-10 mg oral capsule: 1 cap(s) orally once a day  aspirin 81 mg oral delayed release tablet: 1 tab(s) orally once a day   atorvastatin 20 mg oral tablet: 1 tab(s) orally once a day  lisinopril 10 mg oral tablet: 1 tab(s) orally once a day

## 2022-12-17 ENCOUNTER — TRANSCRIPTION ENCOUNTER (OUTPATIENT)
Age: 77
End: 2022-12-17

## 2022-12-17 VITALS
SYSTOLIC BLOOD PRESSURE: 146 MMHG | HEART RATE: 76 BPM | RESPIRATION RATE: 17 BRPM | DIASTOLIC BLOOD PRESSURE: 68 MMHG | OXYGEN SATURATION: 98 %

## 2022-12-17 LAB
ALBUMIN SERPL ELPH-MCNC: 4.1 G/DL — SIGNIFICANT CHANGE UP (ref 3.5–5.2)
ALP SERPL-CCNC: 76 U/L — SIGNIFICANT CHANGE UP (ref 30–115)
ALT FLD-CCNC: 13 U/L — SIGNIFICANT CHANGE UP (ref 0–41)
ANION GAP SERPL CALC-SCNC: 12 MMOL/L — SIGNIFICANT CHANGE UP (ref 7–14)
AST SERPL-CCNC: 22 U/L — SIGNIFICANT CHANGE UP (ref 0–41)
B BURGDOR C6 AB SER-ACNC: NEGATIVE — SIGNIFICANT CHANGE UP
B BURGDOR IGG+IGM SER-ACNC: 0.04 INDEX — SIGNIFICANT CHANGE UP (ref 0.01–0.89)
BASOPHILS # BLD AUTO: 0.03 K/UL — SIGNIFICANT CHANGE UP (ref 0–0.2)
BASOPHILS NFR BLD AUTO: 0.3 % — SIGNIFICANT CHANGE UP (ref 0–1)
BILIRUB SERPL-MCNC: 1.4 MG/DL — HIGH (ref 0.2–1.2)
BUN SERPL-MCNC: 16 MG/DL — SIGNIFICANT CHANGE UP (ref 10–20)
CALCIUM SERPL-MCNC: 8.5 MG/DL — SIGNIFICANT CHANGE UP (ref 8.4–10.5)
CHLORIDE SERPL-SCNC: 102 MMOL/L — SIGNIFICANT CHANGE UP (ref 98–110)
CO2 SERPL-SCNC: 25 MMOL/L — SIGNIFICANT CHANGE UP (ref 17–32)
CREAT SERPL-MCNC: 0.8 MG/DL — SIGNIFICANT CHANGE UP (ref 0.7–1.5)
EGFR: 90 ML/MIN/1.73M2 — SIGNIFICANT CHANGE UP
EOSINOPHIL # BLD AUTO: 0.06 K/UL — SIGNIFICANT CHANGE UP (ref 0–0.7)
EOSINOPHIL NFR BLD AUTO: 0.6 % — SIGNIFICANT CHANGE UP (ref 0–8)
GLUCOSE SERPL-MCNC: 74 MG/DL — SIGNIFICANT CHANGE UP (ref 70–99)
HCT VFR BLD CALC: 42.8 % — SIGNIFICANT CHANGE UP (ref 42–52)
HGB BLD-MCNC: 14.4 G/DL — SIGNIFICANT CHANGE UP (ref 14–18)
IMM GRANULOCYTES NFR BLD AUTO: 0.3 % — SIGNIFICANT CHANGE UP (ref 0.1–0.3)
LYMPHOCYTES # BLD AUTO: 1.82 K/UL — SIGNIFICANT CHANGE UP (ref 1.2–3.4)
LYMPHOCYTES # BLD AUTO: 18.7 % — LOW (ref 20.5–51.1)
MAGNESIUM SERPL-MCNC: 2.1 MG/DL — SIGNIFICANT CHANGE UP (ref 1.8–2.4)
MCHC RBC-ENTMCNC: 31.7 PG — HIGH (ref 27–31)
MCHC RBC-ENTMCNC: 33.6 G/DL — SIGNIFICANT CHANGE UP (ref 32–37)
MCV RBC AUTO: 94.3 FL — HIGH (ref 80–94)
MONOCYTES # BLD AUTO: 0.9 K/UL — HIGH (ref 0.1–0.6)
MONOCYTES NFR BLD AUTO: 9.2 % — SIGNIFICANT CHANGE UP (ref 1.7–9.3)
NEUTROPHILS # BLD AUTO: 6.91 K/UL — HIGH (ref 1.4–6.5)
NEUTROPHILS NFR BLD AUTO: 70.9 % — SIGNIFICANT CHANGE UP (ref 42.2–75.2)
NRBC # BLD: 0 /100 WBCS — SIGNIFICANT CHANGE UP (ref 0–0)
PLATELET # BLD AUTO: 206 K/UL — SIGNIFICANT CHANGE UP (ref 130–400)
POTASSIUM SERPL-MCNC: 4.1 MMOL/L — SIGNIFICANT CHANGE UP (ref 3.5–5)
POTASSIUM SERPL-SCNC: 4.1 MMOL/L — SIGNIFICANT CHANGE UP (ref 3.5–5)
PROT SERPL-MCNC: 5.9 G/DL — LOW (ref 6–8)
RBC # BLD: 4.54 M/UL — LOW (ref 4.7–6.1)
RBC # FLD: 12.6 % — SIGNIFICANT CHANGE UP (ref 11.5–14.5)
SODIUM SERPL-SCNC: 139 MMOL/L — SIGNIFICANT CHANGE UP (ref 135–146)
WBC # BLD: 9.75 K/UL — SIGNIFICANT CHANGE UP (ref 4.8–10.8)
WBC # FLD AUTO: 9.75 K/UL — SIGNIFICANT CHANGE UP (ref 4.8–10.8)

## 2022-12-17 PROCEDURE — 93280 PM DEVICE PROGR EVAL DUAL: CPT | Mod: 26

## 2022-12-17 PROCEDURE — 99024 POSTOP FOLLOW-UP VISIT: CPT

## 2022-12-17 PROCEDURE — 99239 HOSP IP/OBS DSCHRG MGMT >30: CPT

## 2022-12-17 PROCEDURE — 71046 X-RAY EXAM CHEST 2 VIEWS: CPT | Mod: 26

## 2022-12-17 RX ORDER — ACETAMINOPHEN 500 MG
2 TABLET ORAL
Qty: 0 | Refills: 0 | DISCHARGE
Start: 2022-12-17

## 2022-12-17 RX ORDER — ACETAMINOPHEN 500 MG
650 TABLET ORAL EVERY 6 HOURS
Refills: 0 | Status: DISCONTINUED | OUTPATIENT
Start: 2022-12-17 | End: 2022-12-17

## 2022-12-17 RX ADMIN — AMLODIPINE BESYLATE 5 MILLIGRAM(S): 2.5 TABLET ORAL at 05:30

## 2022-12-17 RX ADMIN — LISINOPRIL 10 MILLIGRAM(S): 2.5 TABLET ORAL at 05:31

## 2022-12-17 RX ADMIN — Medication 100 MILLIGRAM(S): at 01:01

## 2022-12-17 RX ADMIN — Medication 100 MILLIGRAM(S): at 11:25

## 2022-12-17 NOTE — DISCHARGE NOTE NURSING/CASE MANAGEMENT/SOCIAL WORK - NSDCPEFALRISK_GEN_ALL_CORE
For information on Fall & Injury Prevention, visit: [email or web address] OR  [email or web address] OR  https://www.cdc.gov/steadi/patient.html

## 2022-12-17 NOTE — DISCHARGE NOTE NURSING/CASE MANAGEMENT/SOCIAL WORK - NSDCVIVACCINE_GEN_ALL_CORE_FT
Tdap; 15-Dec-2022 23:48; Sushma Ruffin (AGGIE); [employer]; [id number] (Exp. Date: 06-Dec-2024); IntraMuscular; Dorsogluteal Right.; 0.5 milliLiter(s); VIS (VIS Published: 09-May-2013, VIS Presented: 15-Dec-2022);

## 2022-12-17 NOTE — PROGRESS NOTE ADULT - SUBJECTIVE AND OBJECTIVE BOX
INTERVAL HPI/OVERNIGHT EVENTS:  Pt is POD #1 Dc PPM. No acute events overnight. Currently V paced, no tele events noted. C/o LLE pain,   Patient denies fever, chills, dizziness, syncope, chest pain, palpitations, SOB, cough, abd pain, n/v/d/c, dysuria, hematuria or unusual rash.     MEDICATIONS  (STANDING):  amLODIPine   Tablet 5 milliGRAM(s) Oral daily  atorvastatin 20 milliGRAM(s) Oral at bedtime  ceFAZolin   IVPB 1000 milliGRAM(s) IV Intermittent every 8 hours  lisinopril 10 milliGRAM(s) Oral daily    MEDICATIONS  (PRN):  acetaminophen     Tablet .. 650 milliGRAM(s) Oral every 6 hours PRN Mild Pain (1 - 3)      Allergies    No Known Allergies    Intolerances        REVIEW OF SYSTEMS    [ ] A ten-point review of systems was otherwise negative except as noted.  [ ] Due to altered mental status/intubation, subjective information were not able to be obtained from the patient. History was obtained, to the extent possible, from review of the chart and collateral sources of information.      Vital Signs Last 24 Hrs  T(C): 36.8 (17 Dec 2022 00:01), Max: 36.8 (17 Dec 2022 00:01)  T(F): 98.2 (17 Dec 2022 00:01), Max: 98.2 (17 Dec 2022 00:01)  HR: 70 (17 Dec 2022 04:11) (65 - 87)  BP: 132/57 (17 Dec 2022 04:11) (128/58 - 138/75)  BP(mean): 82 (17 Dec 2022 04:11) (82 - 101)  RR: 19 (17 Dec 2022 04:11) (18 - 19)  SpO2: 98% (17 Dec 2022 04:11) (97% - 100%)    Parameters below as of 16 Dec 2022 21:22  Patient On (Oxygen Delivery Method): room air        12-16-22 @ 07:01  -  12-17-22 @ 07:00  --------------------------------------------------------  IN: 360 mL / OUT: 400 mL / NET: -40 mL        Physical Exam  GENERAL: In no apparent distress, well nourished, and hydrated.  HEART: Regular rate and rhythm; No murmurs, rubs, or gallops.  PULMONARY: Clear to auscultation and perfusion.  No rales, wheezing, or rhonchi bilaterally.  ABDOMEN: Soft, Nontender, Nondistended; Bowel sounds present  EXTREMITIES:  2+ Peripheral Pulses, No clubbing, cyanosis, or edema  NEUROLOGICAL: AO x4 ,GARNETT, speech clear    LABS:                        14.7   8.10  )-----------( 214      ( 16 Dec 2022 11:49 )             42.2     12-16    142  |  105  |  14  ----------------------------<  92  4.4   |  27  |  0.7    Ca    9.2      16 Dec 2022 11:49  Mg     2.1     12-16    TPro  6.4  /  Alb  4.3  /  TBili  1.2  /  DBili  x   /  AST  22  /  ALT  16  /  AlkPhos  80  12-16    PT/INR - ( 16 Dec 2022 11:49 )   PT: 11.60 sec;   INR: 1.02 ratio         PTT - ( 16 Dec 2022 11:49 )  PTT:32.3 sec      12-16-22 @ 07:01  -  12-17-22 @ 07:00  --------------------------------------------------------  IN: 360 mL / OUT: 400 mL / NET: -40 mL        12-16-22 @ 07:01  -  12-17-22 @ 07:00  --------------------------------------------------------  IN: 360 mL / OUT: 400 mL / NET: -40 mL        RADIOLOGY:   INTERVAL HPI/OVERNIGHT EVENTS:  Pt is POD #1 Dc PPM. No acute events overnight. Currently V paced, no tele events noted. C/o Lt knee pain ,has occurred in the past with changes in the weather.   Patient denies fever, chills, dizziness, syncope, chest pain, palpitations, SOB, cough, abd pain, n/v/d/c, dysuria, hematuria or unusual rash.     MEDICATIONS  (STANDING):  amLODIPine   Tablet 5 milliGRAM(s) Oral daily  atorvastatin 20 milliGRAM(s) Oral at bedtime  ceFAZolin   IVPB 1000 milliGRAM(s) IV Intermittent every 8 hours  lisinopril 10 milliGRAM(s) Oral daily    MEDICATIONS  (PRN):  acetaminophen     Tablet .. 650 milliGRAM(s) Oral every 6 hours PRN Mild Pain (1 - 3)      Allergies    No Known Allergies    Intolerances        REVIEW OF SYSTEMS    [x] A ten-point review of systems was otherwise negative except as noted.  [ ] Due to altered mental status/intubation, subjective information were not able to be obtained from the patient. History was obtained, to the extent possible, from review of the chart and collateral sources of information.      Vital Signs Last 24 Hrs  T(C): 36.8 (17 Dec 2022 00:01), Max: 36.8 (17 Dec 2022 00:01)  T(F): 98.2 (17 Dec 2022 00:01), Max: 98.2 (17 Dec 2022 00:01)  HR: 70 (17 Dec 2022 04:11) (65 - 87)  BP: 132/57 (17 Dec 2022 04:11) (128/58 - 138/75)  BP(mean): 82 (17 Dec 2022 04:11) (82 - 101)  RR: 19 (17 Dec 2022 04:11) (18 - 19)  SpO2: 98% (17 Dec 2022 04:11) (97% - 100%)    Parameters below as of 16 Dec 2022 21:22  Patient On (Oxygen Delivery Method): room air        12-16-22 @ 07:01  -  12-17-22 @ 07:00  --------------------------------------------------------  IN: 360 mL / OUT: 400 mL / NET: -40 mL        Physical Exam  GENERAL: Elderly male, In no apparent distress, well nourished, and hydrated.  HEART: Regular rate and rhythm; No murmurs, rubs, or gallops.  PULMONARY: Clear to auscultation and perfusion.  No rales, wheezing, or rhonchi bilaterally.  CHEST: Lt chest wall surgical dressing c/d/i  ABDOMEN: Soft, Nontender, Nondistended; Bowel sounds present  EXTREMITIES:  2+ Peripheral Pulses, No clubbing, cyanosis, or edema  NEUROLOGICAL: AO x4 ,GARNETT, speech clear    LABS:                        14.7   8.10  )-----------( 214      ( 16 Dec 2022 11:49 )             42.2     12-16    142  |  105  |  14  ----------------------------<  92  4.4   |  27  |  0.7    Ca    9.2      16 Dec 2022 11:49  Mg     2.1     12-16    TPro  6.4  /  Alb  4.3  /  TBili  1.2  /  DBili  x   /  AST  22  /  ALT  16  /  AlkPhos  80  12-16    PT/INR - ( 16 Dec 2022 11:49 )   PT: 11.60 sec;   INR: 1.02 ratio         PTT - ( 16 Dec 2022 11:49 )  PTT:32.3 sec      12-16-22 @ 07:01  -  12-17-22 @ 07:00  --------------------------------------------------------  IN: 360 mL / OUT: 400 mL / NET: -40 mL        12-16-22 @ 07:01  -  12-17-22 @ 07:00  --------------------------------------------------------  IN: 360 mL / OUT: 400 mL / NET: -40 mL        RADIOLOGY:  < from: TTE Echo Complete w/o Contrast w/ Doppler (12.16.22 @ 14:50) >  Summary:   1. Normal left ventricular size and wall thicknesses, with normal   systolic and diastolic function.   2. LV Ejection Fraction by Bhat's Method with a biplane EF of 56 %.   3. Normal right ventricular size and function.   4. No hemodynamically significant valvular stenosis or regurgitation.   5. No pericardial effusion.    < end of copied text >

## 2022-12-17 NOTE — DISCHARGE NOTE NURSING/CASE MANAGEMENT/SOCIAL WORK - PATIENT PORTAL LINK FT
You can access the FollowMyHealth Patient Portal offered by [facility] by registering at the following website: [email or web address]. By joining [facility]’s FollowMyHealth portal, you will also be able to view your health information using other applications (apps) compatible with our system.

## 2022-12-17 NOTE — PROGRESS NOTE ADULT - ASSESSMENT
EP: Williams    78 Y/O independent male with controlled HTN and HLD who presents to ED with daughter after syncopal episode. Found to have Mobitz II. Underwent DC PPP ([employer]) on 12/16/22, no immediate postop complications noted.     W/U: EF 56%, no valvular disease.    TSH 1.53    Impression:  Syncope likely secondary to heart block  Sinus bradycardia with Mobitz Type II  Hx CAD to RCA  Hx HTN    Plan:  - Interrogation complete:   - CXR noted  - EKG  - HOLD any heparin, lovenox or DOACs for 48 hr following procedure to minimize risk of hematoma  - May resume PO diet  - Cont current management  - Tylenol for pain  - Ambulate  - No further EP intervention, fu as outpatient in 1 week for wound check. Office will call with appt    Discharge Instructions:  - No heavy lifting > 5 lbs. for 4-6 weeks with Lt arm  - Do not raise your Lt arm above shoulder level for 4-6 weeks.   - Do not get site wet for 7 days  - No submerging in water for 1 month  - Leave steri-strips in place, they will fall off on their own  - No driving for 1 week  - Fu with EP office next week

## 2022-12-23 DIAGNOSIS — I25.10 ATHEROSCLEROTIC HEART DISEASE OF NATIVE CORONARY ARTERY WITHOUT ANGINA PECTORIS: ICD-10-CM

## 2022-12-23 DIAGNOSIS — I45.3 TRIFASCICULAR BLOCK: ICD-10-CM
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
## 2022-12-23 DIAGNOSIS — E78.5 HYPERLIPIDEMIA, UNSPECIFIED: ICD-10-CM

## 2022-12-23 DIAGNOSIS — I10 ESSENTIAL (PRIMARY) HYPERTENSION: ICD-10-CM
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
## 2022-12-23 DIAGNOSIS — S01.01XA LACERATION WITHOUT FOREIGN BODY OF SCALP, INITIAL ENCOUNTER: ICD-10-CM

## 2022-12-23 DIAGNOSIS — I44.1 ATRIOVENTRICULAR BLOCK, SECOND DEGREE: ICD-10-CM
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
## 2022-12-23 DIAGNOSIS — Z79.82 LONG TERM (CURRENT) USE OF ASPIRIN: ICD-10-CM
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
## 2022-12-30 ENCOUNTER — APPOINTMENT (OUTPATIENT)
Dept: CARDIOLOGY | Facility: CLINIC | Age: 77
End: 2022-12-30
Payer: MEDICARE

## 2022-12-30 VITALS
HEART RATE: 92 BPM | WEIGHT: 150 LBS | HEIGHT: 66 IN | RESPIRATION RATE: 16 BRPM | BODY MASS INDEX: 24.11 KG/M2 | SYSTOLIC BLOOD PRESSURE: 155 MMHG | TEMPERATURE: 98 F | DIASTOLIC BLOOD PRESSURE: 75 MMHG

## 2022-12-30 DIAGNOSIS — Z48.89 ENCOUNTER FOR OTHER SPECIFIED SURGICAL AFTERCARE: ICD-10-CM

## 2022-12-30 DIAGNOSIS — Z71.89 OTHER SPECIFIED COUNSELING: ICD-10-CM

## 2022-12-30 PROCEDURE — 93000 ELECTROCARDIOGRAM COMPLETE: CPT | Mod: 59

## 2022-12-30 PROCEDURE — 99024 POSTOP FOLLOW-UP VISIT: CPT

## 2022-12-30 PROCEDURE — 93280 PM DEVICE PROGR EVAL DUAL: CPT

## 2022-12-30 RX ORDER — METHOCARBAMOL 750 MG/1
750 TABLET, FILM COATED ORAL EVERY 6 HOURS
Qty: 28 | Refills: 0 | Status: COMPLETED | COMMUNITY
Start: 2021-08-23 | End: 2022-12-30

## 2022-12-30 RX ORDER — METHYLPREDNISOLONE 4 MG/1
4 TABLET ORAL
Qty: 1 | Refills: 0 | Status: COMPLETED | COMMUNITY
Start: 2021-06-25 | End: 2022-12-30

## 2022-12-30 RX ORDER — OXYCODONE 5 MG/1
5 TABLET ORAL EVERY 6 HOURS
Qty: 28 | Refills: 0 | Status: COMPLETED | COMMUNITY
Start: 2021-08-23 | End: 2022-12-30

## 2022-12-30 RX ORDER — FAMOTIDINE 20 MG/1
20 TABLET, FILM COATED ORAL
Qty: 30 | Refills: 0 | Status: COMPLETED | COMMUNITY
Start: 2021-06-25 | End: 2022-12-30

## 2022-12-30 NOTE — REASON FOR VISIT
[Follow-up Device Check] : is here today for a follow-up device check visit for [Family Member] : family member [Other: _____] : [unfilled]

## 2023-01-03 NOTE — HISTORY OF PRESENT ILLNESS
[de-identified] : 77 years old with PMH of HTN, HLD, presented to ED in Dec 2022 after syncopal episode, sustaining minor scalp laceration. 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 A dual chamber PPM was implanted on 12/16/2022.\par \par Cardiologist: Dr. Lopez

## 2023-01-03 NOTE — PHYSICAL EXAM
[General Appearance - Well Developed] : well developed [Normal Appearance] : normal appearance [Well Groomed] : well groomed [No Deformities] : no deformities [General Appearance - In No Acute Distress] : no acute distress [Heart Rate And Rhythm] : heart rate and rhythm were normal [Heart Sounds] : normal S1 and S2 [Respiration, Rhythm And Depth] : normal respiratory rhythm and effort [] : no respiratory distress [Left Infraclavicular] : left infraclavicular area [Clean] : clean [Dry] : dry [Healing Well] : healing well [FreeTextEntry2] : intact dermabond

## 2023-01-03 NOTE — PROCEDURE
[No] : not [NSR] : normal sinus rhythm [Pacemaker] : pacemaker [DDDR] : DDDR [Voltage: ___ volts] : Voltage was [unfilled] volts [Longevity: ___ months] : The estimated remaining battery life is [unfilled] months [Lead Imp:  ___ohms] : lead impedance was [unfilled] ohms [Sensing Amplitude ___mv] : sensing amplitude was [unfilled] mv [___V @] : [unfilled] V [___ ms] : [unfilled] ms [de-identified] : SAL [de-identified] : W1DR01 [de-identified] : [id number] [de-identified] : 12/16/22 [de-identified] : AAIR-DDDR [de-identified] :  [de-identified] : AP: 12.1% // : 42.2%\par 1 PAF episode on 12/30/22 lasting 34 seconds (avg. v-rate: 104 bpm).\par

## 2023-01-03 NOTE — ASSESSMENT
[FreeTextEntry1] : 77 years old  post Dual chamber PPM implant for high degree AVB. presents for wound check and device interrogation.\par \par Incision check - telfa dressing taken out. dermabond semi intact with a less than a mm superficial opening.\par applied bacitracin and covered with band aid. Provided patient with tegaderm to cover when showering.\par Instructed to keep clean and dry.\par \par Device interrogation - not dependent\par had a 36 seconds a flutter/afib\par Chads vasc score  at least 3 ( htn, age1)\par \par I discussed with the patient and daughter  stroke prevention based on CHADS-VASc sore, and options to reduce that risk with  initiation of AC. \par Patient was assessed for bleeding risks  based on HAS-BLED score and educated on modifiable bleeding risk factors such as correct dosage, frequency, adherence, to avoid  concomitant use of over-the -counter nonsteroidal anti-inflammatory medications, excessive alcohol intake, and compliance with antihypertensive meds to control blood pressure.\par Potential complications associated with AC such as bleeding , signs and symptoms, when to call office and when to seek immediate medical attention/ ER visit  discussed in great  details..\par \par Ordered xarelto 20mg daily based on CC of 72ml/min\par \par Enrolled on remote. every 91 days auto transmission\par Remote monitoring was discussed with patient, schedule, process,  as well as associated co-pay that may not covered by their insurance.\par \par RTO in 2 months.- ac evaluation and blood works\par \par \par \par \par \par \par \par \par \par

## 2023-01-03 NOTE — PHYSICAL EXAM
[General Appearance - Well Developed] : well developed [Normal Appearance] : normal appearance [Well Groomed] : well groomed [No Deformities] : no deformities [General Appearance - In No Acute Distress] : no acute distress [Heart Rate And Rhythm] : heart rate and rhythm were normal [Heart Sounds] : normal S1 and S2 [] : no respiratory distress [Respiration, Rhythm And Depth] : normal respiratory rhythm and effort [Left Infraclavicular] : left infraclavicular area [Clean] : clean [Dry] : dry [Healing Well] : healing well [FreeTextEntry2] : intact dermabond

## 2023-01-03 NOTE — CARDIOLOGY SUMMARY
[de-identified] : 12/30/2022 - 84bpm  RV paced [de-identified] : 12/10/2022 LVEF 56% [de-identified] : 12/16/2022 - dual chamber PPM for type 2 AV block - symptomatic bradycardia

## 2023-01-03 NOTE — PROCEDURE
[No] : not [NSR] : normal sinus rhythm [Pacemaker] : pacemaker [DDDR] : DDDR [Voltage: ___ volts] : Voltage was [unfilled] volts [Longevity: ___ months] : The estimated remaining battery life is [unfilled] months [Lead Imp:  ___ohms] : lead impedance was [unfilled] ohms [Sensing Amplitude ___mv] : sensing amplitude was [unfilled] mv [___V @] : [unfilled] V [___ ms] : [unfilled] ms [de-identified] : SAL [de-identified] : W1DR01 [de-identified] : [id number] [de-identified] : 12/16/22 [de-identified] : AAIR-DDDR [de-identified] :  [de-identified] : AP: 12.1% // : 42.2%\par 1 PAF episode on 12/30/22 lasting 34 seconds (avg. v-rate: 104 bpm).\par

## 2023-01-03 NOTE — HISTORY OF PRESENT ILLNESS
[de-identified] : 77 years old with PMH of HTN, HLD, presented to ED in Dec 2022 after syncopal episode, sustaining minor scalp laceration. 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 A dual chamber PPM was implanted on 12/16/2022.\par \par Cardiologist: Dr. Lopez

## 2023-01-03 NOTE — CARDIOLOGY SUMMARY
[de-identified] : 12/30/2022 - 84bpm  RV paced [de-identified] : 12/10/2022 LVEF 56% [de-identified] : 12/16/2022 - dual chamber PPM for type 2 AV block - symptomatic bradycardia

## 2023-02-24 ENCOUNTER — APPOINTMENT (OUTPATIENT)
Dept: ELECTROPHYSIOLOGY | Facility: CLINIC | Age: 78
End: 2023-02-24
Payer: MEDICARE

## 2023-02-24 VITALS
SYSTOLIC BLOOD PRESSURE: 156 MMHG | HEIGHT: 66 IN | RESPIRATION RATE: 16 BRPM | BODY MASS INDEX: 24.11 KG/M2 | HEART RATE: 78 BPM | DIASTOLIC BLOOD PRESSURE: 82 MMHG | TEMPERATURE: 97 F | WEIGHT: 150 LBS

## 2023-02-24 DIAGNOSIS — R55 SYNCOPE AND COLLAPSE: ICD-10-CM

## 2023-02-24 DIAGNOSIS — Z51.81 ENCOUNTER FOR THERAPEUTIC DRUG LVL MONITORING: ICD-10-CM

## 2023-02-24 DIAGNOSIS — Z45.018 ENCOUNTER FOR ADJUSTMENT AND MANAGEMENT OF OTHER PART OF CARDIAC PACEMAKER: ICD-10-CM

## 2023-02-24 PROCEDURE — 93280 PM DEVICE PROGR EVAL DUAL: CPT

## 2023-02-24 PROCEDURE — 93000 ELECTROCARDIOGRAM COMPLETE: CPT | Mod: 59

## 2023-02-24 RX ORDER — AMLODIPINE BESYLATE AND BENAZEPRIL HYDROCHLORIDE 5; 10 MG/1; MG/1
5-10 CAPSULE ORAL DAILY
Refills: 0 | Status: ACTIVE | COMMUNITY

## 2023-02-24 RX ORDER — ATORVASTATIN CALCIUM 20 MG/1
20 TABLET, FILM COATED ORAL
Qty: 90 | Refills: 3 | Status: ACTIVE | COMMUNITY
Start: 2021-08-05

## 2023-02-24 NOTE — HISTORY OF PRESENT ILLNESS
[de-identified] : 78 years old with PMH of HTN, HLD, presented to ED in Dec 2022 after syncopal episode, sustaining minor scalp laceration. 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 A dual chamber PPM was implanted on 12/16/2022.\par \par Cardiologist: Dr. Lopez

## 2023-02-24 NOTE — CARDIOLOGY SUMMARY
[de-identified] : 2/24/2023 - 78 bpm SR with occ RV paced with occ pvc\par 12/30/2022 - 84bpm  RV paced [de-identified] : 12/16/2022 - dual chamber PPM for type 2 AV block - symptomatic bradycardia, syncope [de-identified] : 12/10/2022 LVEF 56%

## 2023-02-24 NOTE — PROCEDURE
[No] : not [NSR] : normal sinus rhythm [Pacemaker] : pacemaker [DDDR] : DDDR [Voltage: ___ volts] : Voltage was [unfilled] volts [Longevity: ___ months] : The estimated remaining battery life is [unfilled] months [Lead Imp:  ___ohms] : lead impedance was [unfilled] ohms [Sensing Amplitude ___mv] : sensing amplitude was [unfilled] mv [___V @] : [unfilled] V [___ ms] : [unfilled] ms [Threshold Testing Performed] : Threshold testing was performed [None] : none [Programmed for Longevity] : output reprogrammed for improved battery longevity [Counters Reset] : the counters were reset [de-identified] : 73bpm [de-identified] : SAL [de-identified] : W1DR01 [de-identified] : [id number] [de-identified] : 12/16/22 [de-identified] : AAIR-DDDR [de-identified] :  [de-identified] : AP: 53.8% // : 18.3%\par No events. Normal device function.

## 2023-02-24 NOTE — ASSESSMENT
[FreeTextEntry1] : 78 years old  post Dual chamber PPM implant for high degree AVB. presents for  device interrogation and ac assessment.\par Denies any sob, TERRY, PND, orthopnea, no palpitations, no dizziness,lightheadedness, denies chest pains\par \par \par Incision check -completely healed\par Device interrogation - not dependent\par #PAF- Chads vasc score  at least 3 ( htn, age1)\par no new afib episode\par tolerating  xarelto 20mg daily- no signs of bleeding.\par F/up blood works - cbc, cmp done\par \par Enrolled on remote and transmitting appropriately.\par \par RTO in 12 months.-\par \par \par \par \par \par \par \par \par \par

## 2023-02-27 LAB
ALBUMIN SERPL ELPH-MCNC: 4.6 G/DL
ALP BLD-CCNC: 77 U/L
ALT SERPL-CCNC: 15 U/L
ANION GAP SERPL CALC-SCNC: 11 MMOL/L
AST SERPL-CCNC: 22 U/L
BASOPHILS # BLD AUTO: 0.03 K/UL
BASOPHILS NFR BLD AUTO: 0.5 %
BILIRUB SERPL-MCNC: 0.9 MG/DL
BUN SERPL-MCNC: 13 MG/DL
CALCIUM SERPL-MCNC: 9.3 MG/DL
CHLORIDE SERPL-SCNC: 104 MMOL/L
CO2 SERPL-SCNC: 28 MMOL/L
CREAT SERPL-MCNC: 0.9 MG/DL
EGFR: 86 ML/MIN/1.73M2
EOSINOPHIL # BLD AUTO: 0.06 K/UL
EOSINOPHIL NFR BLD AUTO: 0.9 %
GLUCOSE SERPL-MCNC: 98 MG/DL
HCT VFR BLD CALC: 44.8 %
HGB BLD-MCNC: 15 G/DL
IMM GRANULOCYTES NFR BLD AUTO: 0.2 %
LYMPHOCYTES # BLD AUTO: 1.87 K/UL
LYMPHOCYTES NFR BLD AUTO: 28.9 %
MAN DIFF?: NORMAL
MCHC RBC-ENTMCNC: 32.1 PG
MCHC RBC-ENTMCNC: 33.5 G/DL
MCV RBC AUTO: 95.7 FL
MONOCYTES # BLD AUTO: 0.55 K/UL
MONOCYTES NFR BLD AUTO: 8.5 %
NEUTROPHILS # BLD AUTO: 3.96 K/UL
NEUTROPHILS NFR BLD AUTO: 61 %
PLATELET # BLD AUTO: 217 K/UL
POTASSIUM SERPL-SCNC: 4.6 MMOL/L
PROT SERPL-MCNC: 7.2 G/DL
RBC # BLD: 4.68 M/UL
RBC # FLD: 12.4 %
SODIUM SERPL-SCNC: 143 MMOL/L
WBC # FLD AUTO: 6.48 K/UL

## 2023-03-22 NOTE — ED ADULT TRIAGE NOTE - BSA (M2)
1.69 Protopic Counseling: Patient may experience a mild burning sensation during topical application. Protopic is not approved in children less than 2 years of age. There have been case reports of hematologic and skin malignancies in patients using topical calcineurin inhibitors although causality is questionable.

## 2023-03-23 ENCOUNTER — RX RENEWAL (OUTPATIENT)
Age: 78
End: 2023-03-23

## 2023-03-23 RX ORDER — RIVAROXABAN 20 MG/1
20 TABLET, FILM COATED ORAL
Qty: 180 | Refills: 3 | Status: ACTIVE | COMMUNITY
Start: 2022-12-30 | End: 1900-01-01

## 2023-03-23 RX ORDER — METOPROLOL TARTRATE 25 MG/1
25 TABLET, FILM COATED ORAL
Qty: 90 | Refills: 3 | Status: ACTIVE | COMMUNITY
Start: 2021-08-07 | End: 1900-01-01

## 2023-05-26 ENCOUNTER — NON-APPOINTMENT (OUTPATIENT)
Age: 78
End: 2023-05-26

## 2023-05-26 ENCOUNTER — APPOINTMENT (OUTPATIENT)
Dept: CARDIOLOGY | Facility: CLINIC | Age: 78
End: 2023-05-26
Payer: MEDICARE

## 2023-05-26 PROCEDURE — 93294 REM INTERROG EVL PM/LDLS PM: CPT

## 2023-05-26 PROCEDURE — 93296 REM INTERROG EVL PM/IDS: CPT

## 2023-08-25 ENCOUNTER — NON-APPOINTMENT (OUTPATIENT)
Age: 78
End: 2023-08-25

## 2023-08-25 ENCOUNTER — APPOINTMENT (OUTPATIENT)
Dept: CARDIOLOGY | Facility: CLINIC | Age: 78
End: 2023-08-25
Payer: MEDICARE

## 2023-08-25 PROCEDURE — 93295 DEV INTERROG REMOTE 1/2/MLT: CPT

## 2023-08-25 PROCEDURE — 93296 REM INTERROG EVL PM/IDS: CPT

## 2023-11-28 ENCOUNTER — NON-APPOINTMENT (OUTPATIENT)
Age: 78
End: 2023-11-28

## 2023-11-28 ENCOUNTER — APPOINTMENT (OUTPATIENT)
Dept: CARDIOLOGY | Facility: CLINIC | Age: 78
End: 2023-11-28
Payer: MEDICARE

## 2023-11-28 PROCEDURE — 93294 REM INTERROG EVL PM/LDLS PM: CPT

## 2023-11-28 PROCEDURE — 93296 REM INTERROG EVL PM/IDS: CPT

## 2024-01-02 ENCOUNTER — APPOINTMENT (OUTPATIENT)
Dept: CARDIOLOGY | Facility: CLINIC | Age: 79
End: 2024-01-02
Payer: MEDICARE

## 2024-01-02 VITALS
WEIGHT: 142 LBS | HEART RATE: 82 BPM | SYSTOLIC BLOOD PRESSURE: 140 MMHG | DIASTOLIC BLOOD PRESSURE: 70 MMHG | TEMPERATURE: 96 F | BODY MASS INDEX: 22.82 KG/M2 | RESPIRATION RATE: 18 BRPM | HEIGHT: 66 IN | OXYGEN SATURATION: 96 %

## 2024-01-02 DIAGNOSIS — E78.5 HYPERLIPIDEMIA, UNSPECIFIED: ICD-10-CM

## 2024-01-02 DIAGNOSIS — I25.10 ATHEROSCLEROTIC HEART DISEASE OF NATIVE CORONARY ARTERY W/OUT ANGINA PECTORIS: ICD-10-CM

## 2024-01-02 DIAGNOSIS — R00.1 BRADYCARDIA, UNSPECIFIED: ICD-10-CM

## 2024-01-02 PROCEDURE — 99214 OFFICE O/P EST MOD 30 MIN: CPT | Mod: 25

## 2024-01-02 PROCEDURE — 93000 ELECTROCARDIOGRAM COMPLETE: CPT

## 2024-01-02 RX ORDER — METOPROLOL SUCCINATE 25 MG/1
25 TABLET, EXTENDED RELEASE ORAL
Qty: 90 | Refills: 3 | Status: ACTIVE | COMMUNITY
Start: 2024-01-02 | End: 1900-01-01

## 2024-01-02 NOTE — ASSESSMENT
[FreeTextEntry1] : 79 yo male with pmhx and presentation as above cad htn/dyslipidemia sss, s/p ppm cont all meds add low dose bb labs reviewed, all at goal bp monitoring at home aggressive risk modif act as tolerated rtc 9-12 months

## 2024-01-02 NOTE — HISTORY OF PRESENT ILLNESS
[FreeTextEntry1] : 77 yo male with pmhx as below is here for a f/up visit 8/21 s/p neuro sx pre op wup revealed severe 1 v cad - decided to proceed with sx without revasc s/p ppm paroxysmal a.fib, started on ac no major cvs complains et is excellent

## 2024-01-02 NOTE — PHYSICAL EXAM
[No Acute Distress] : no acute distress [Normal Venous Pressure] : normal venous pressure [No Carotid Bruit] : no carotid bruit [Normal S1, S2] : normal S1, S2 [No Rub] : no rub [No Gallop] : no gallop [Murmur] : murmur [Clear Lung Fields] : clear lung fields [Good Air Entry] : good air entry [No Respiratory Distress] : no respiratory distress  [Soft] : abdomen soft [Non Tender] : non-tender [No Masses/organomegaly] : no masses/organomegaly [Normal Bowel Sounds] : normal bowel sounds [Normal Gait] : normal gait [No Edema] : no edema [No Cyanosis] : no cyanosis [No Clubbing] : no clubbing [No Varicosities] : no varicosities [No Rash] : no rash [No Skin Lesions] : no skin lesions [Moves all extremities] : moves all extremities [No Focal Deficits] : no focal deficits [Normal Speech] : normal speech [Alert and Oriented] : alert and oriented [Normal memory] : normal memory [de-identified] : 2/6 syst m

## 2024-02-02 NOTE — ED ADULT NURSE NOTE - NSFALLRSKUNASSIST_ED_ALL_ED
RN COVID TREATMENT VISIT  02/02/24      The patient has been triaged and does not require a higher level of care.    Joaquin Dacosta  52 year old  Current weight? 165    Has the patient been seen by a primary care provider at an [facility] or [facility] within the past two years? Yes.   Have you been in close proximity to/do you have a known exposure to a person with a confirmed case of influenza? No.     General treatment eligibility:  Date of positive COVID test (PCR or at home)?  2/1/24    Are you or have you been hospitalized for this COVID-19 infection? No.   Have you received monoclonal antibodies or antiviral treatment for COVID-19 since this positive test? No.   Do you have any of the following conditions that place you at risk of being very sick from COVID-19?   - Age 50 years or older  Yes, patient has at least one high risk condition as noted above.     Current COVID symptoms:   - muscle or body aches  - congestion or runny nose  Yes. Patient has at least one symptom as selected.     How many days since symptoms started? 5 days or less. Established patient, 12 years or older weighing at least 88.2 lbs, who has symptoms that started in the past 5 days, has not been hospitalized nor received treatment already, and is at risk for being very sick from COVID-19.     Treatment eligibility by RN:  Are you currently pregnant or nursing? No  Do you have a clinically significant hypersensitivity to nirmatrelvir or ritonavir, or toxic epidermal necrolysis (TEN) or Garcias-Haris Syndrome? No  Do you have a history of hepatitis, any hepatic impairment on the Problem List (such as Child-Fuentes Class C, cirrhosis, fatty liver disease, alcoholic liver disease), or was the last liver lab (hepatic panel, ALT, AST, ALK Phos, bilirubin) elevated in the past 6 months? No  Do you have any history of severe renal impairment (eGFR < 30mL/min)? No    Is patient eligible to continue? Yes, patient meets  all eligibility requirements for the RN COVID treatment (as denoted by all no responses above).     Current Outpatient Medications   Medication Sig Dispense Refill    albuterol (PROAIR HFA/PROVENTIL HFA/VENTOLIN HFA) 108 (90 Base) MCG/ACT inhaler Inhale 2 puffs into the lungs every 6 hours as needed for shortness of breath / dyspnea or wheezing 1 Inhaler 0    amLODIPine (NORVASC) 10 MG tablet Take 1 tablet (10 mg) by mouth daily 90 tablet 3    atorvastatin (LIPITOR) 40 MG tablet Take 1 tablet (40 mg) by mouth daily 90 tablet 3    desonide (DESOWEN) 0.05 % external cream as needed      Fexofenadine HCl (ALLEGRA PO) Take 180 mg by mouth daily      FIBER ADULT GUMMIES PO Take by mouth daily      MULTI-VITAMIN OR TABS Take by mouth daily 0 0    Risankizumab-rzaa (SKYRIZI) 150 MG/ML subcutaneous Inject 150 mg Subcutaneous every 3 months  0    triamcinolone (KENALOG) 0.1 % external cream PLEASE SEE ATTACHED FOR DETAILED DIRECTIONS (Patient not taking: Reported on 6/20/2023)      UNABLE TO FIND daily MEDICATION NAME: digestive advantage, OTC      valACYclovir (VALTREX) 1000 mg tablet Take 2 tablets twice per day at the onset of cold sore. 4 tablet 11       Medications from List 1 of the standing order (on medications that exclude the use of Paxlovid) that patient is taking: NONE. Is patient taking [place]'s Wort? No  Is patient taking Pawan's Wort or any meds from List 1? No.   Medications from List 2 of the standing order (on meds that provider needs to adjust) that patient is taking: amlodipine (Norvasc), explained a provider visit is necessary to discuss medication adjustments while taking Paxlovid. Is patient on any of the meds from List 2?  Yes - pt was scheduled for virtual urgent care visit today.    Hillary Ayala RN         no

## 2024-02-23 ENCOUNTER — APPOINTMENT (OUTPATIENT)
Dept: ELECTROPHYSIOLOGY | Facility: CLINIC | Age: 79
End: 2024-02-23

## 2024-05-24 ENCOUNTER — NON-APPOINTMENT (OUTPATIENT)
Age: 79
End: 2024-05-24

## 2024-05-24 ENCOUNTER — APPOINTMENT (OUTPATIENT)
Dept: CARDIOLOGY | Facility: CLINIC | Age: 79
End: 2024-05-24
Payer: MEDICARE

## 2024-05-24 PROCEDURE — 93294 REM INTERROG EVL PM/LDLS PM: CPT

## 2024-05-24 PROCEDURE — 93296 REM INTERROG EVL PM/IDS: CPT

## 2024-06-27 ENCOUNTER — APPOINTMENT (OUTPATIENT)
Dept: ELECTROPHYSIOLOGY | Facility: CLINIC | Age: 79
End: 2024-06-27
Payer: MEDICARE

## 2024-06-27 VITALS
SYSTOLIC BLOOD PRESSURE: 140 MMHG | DIASTOLIC BLOOD PRESSURE: 80 MMHG | HEIGHT: 66 IN | BODY MASS INDEX: 23.46 KG/M2 | HEART RATE: 80 BPM | WEIGHT: 146 LBS

## 2024-06-27 DIAGNOSIS — I45.10 UNSPECIFIED RIGHT BUNDLE-BRANCH BLOCK: ICD-10-CM

## 2024-06-27 DIAGNOSIS — I44.4 LEFT ANTERIOR FASCICULAR BLOCK: ICD-10-CM

## 2024-06-27 DIAGNOSIS — I45.3 TRIFASCICULAR BLOCK: ICD-10-CM

## 2024-06-27 DIAGNOSIS — I48.0 PAROXYSMAL ATRIAL FIBRILLATION: ICD-10-CM

## 2024-06-27 DIAGNOSIS — Z45.018 ENCOUNTER FOR ADJUSTMENT AND MANAGEMENT OF OTHER PART OF CARDIAC PACEMAKER: ICD-10-CM

## 2024-06-27 PROCEDURE — 99214 OFFICE O/P EST MOD 30 MIN: CPT | Mod: 25

## 2024-06-27 PROCEDURE — 93280 PM DEVICE PROGR EVAL DUAL: CPT

## 2024-08-12 NOTE — ED PROVIDER NOTE - NS_EDPROVIDERDISPOUSERTYPE_ED_A_ED
Pt ambulated to the restroom with a steady gait    Attending Attestation (For Attendings USE Only)...

## 2024-08-26 DIAGNOSIS — D33.4 BENIGN NEOPLASM OF SPINAL CORD: ICD-10-CM

## 2024-08-26 NOTE — ASSESSMENT
[FreeTextEntry1] : Mr. HICKMAN will proceed with a surveillance MRI thoracic w/wo contrast at [facility]. He will notify me once the MRI is completed. If there is no tumor recurrence / acute findings then we will repeat the MRI in 1 year to assess stability.    Clarisa Arce MS PA-C Senior Physician Assistant [facility]    Michaelle Ann MD FAANS Chair, Department of Neurosurgery [facility]

## 2024-08-26 NOTE — HISTORY OF PRESENT ILLNESS
[FreeTextEntry1] : Mr. HICKMAN is doing well s/p T11/T12 laminectomy, resection of lesion with instrumentation and fusion. Pathology: Schwannoma. Surgery Date: 8/10/2021.   His last MRI thoracic spine w/wo was performed at [facility] 7/21/2022. Postop changed noted with good tumor resection.   He continues to do well. No acute complaints.

## 2024-09-25 ENCOUNTER — NON-APPOINTMENT (OUTPATIENT)
Age: 79
End: 2024-09-25

## 2024-09-26 ENCOUNTER — APPOINTMENT (OUTPATIENT)
Dept: CARDIOLOGY | Facility: CLINIC | Age: 79
End: 2024-09-26
Payer: MEDICARE

## 2024-09-26 PROCEDURE — 93296 REM INTERROG EVL PM/IDS: CPT

## 2024-09-26 PROCEDURE — 93294 REM INTERROG EVL PM/LDLS PM: CPT

## 2024-11-07 NOTE — ED PROVIDER NOTE - PHYSICAL EXAMINATION
[Procedure: _________] : a [unfilled] procedure visit CONSTITUTIONAL: Well-appearing; well-nourished; in no apparent distress.   EYES: PERRL; EOM intact.   NECK: Supple; non-tender; no cervical lymphadenopathy.   CARDIOVASCULAR: Normal S1, S2; no murmurs, rubs, or gallops.   RESPIRATORY: Normal chest excursion with respiration; breath sounds clear and equal bilaterally; no wheezes, rhonchi, or rales.  GI/: Non-distended; non-tender; no palpable organomegaly.   MS: No evidence of trauma or deformity. Normal ROM in all four extremities; non-tender to palpation; distal pulses are normal.   SKIN: lac to posterior scalp; otherwise warm; dry; good turgor; no apparent lesions or exudate.   NEURO/PSYCH: A & O x 4; grossly unremarkable. mood and manner are appropriate.

## 2024-11-30 ENCOUNTER — RX RENEWAL (OUTPATIENT)
Age: 79
End: 2024-11-30

## 2024-12-26 ENCOUNTER — APPOINTMENT (OUTPATIENT)
Dept: CARDIOLOGY | Facility: CLINIC | Age: 79
End: 2024-12-26
Payer: MEDICARE

## 2024-12-26 ENCOUNTER — NON-APPOINTMENT (OUTPATIENT)
Age: 79
End: 2024-12-26

## 2024-12-26 PROCEDURE — 93294 REM INTERROG EVL PM/LDLS PM: CPT

## 2024-12-26 PROCEDURE — 93296 REM INTERROG EVL PM/IDS: CPT

## 2025-01-07 ENCOUNTER — APPOINTMENT (OUTPATIENT)
Dept: CARDIOLOGY | Facility: CLINIC | Age: 80
End: 2025-01-07
Payer: MEDICARE

## 2025-01-07 ENCOUNTER — NON-APPOINTMENT (OUTPATIENT)
Age: 80
End: 2025-01-07

## 2025-01-07 VITALS
RESPIRATION RATE: 18 BRPM | WEIGHT: 143 LBS | HEART RATE: 85 BPM | DIASTOLIC BLOOD PRESSURE: 80 MMHG | SYSTOLIC BLOOD PRESSURE: 135 MMHG | HEIGHT: 66 IN | OXYGEN SATURATION: 98 % | TEMPERATURE: 97 F | BODY MASS INDEX: 22.98 KG/M2

## 2025-01-07 DIAGNOSIS — E78.5 HYPERLIPIDEMIA, UNSPECIFIED: ICD-10-CM

## 2025-01-07 DIAGNOSIS — R00.1 BRADYCARDIA, UNSPECIFIED: ICD-10-CM

## 2025-01-07 DIAGNOSIS — I25.10 ATHEROSCLEROTIC HEART DISEASE OF NATIVE CORONARY ARTERY W/OUT ANGINA PECTORIS: ICD-10-CM

## 2025-01-07 PROCEDURE — 93000 ELECTROCARDIOGRAM COMPLETE: CPT

## 2025-01-07 PROCEDURE — 99214 OFFICE O/P EST MOD 30 MIN: CPT | Mod: 25

## 2025-03-27 ENCOUNTER — NON-APPOINTMENT (OUTPATIENT)
Age: 80
End: 2025-03-27

## 2025-03-27 ENCOUNTER — APPOINTMENT (OUTPATIENT)
Dept: CARDIOLOGY | Facility: CLINIC | Age: 80
End: 2025-03-27
Payer: MEDICARE

## 2025-03-27 PROCEDURE — 93294 REM INTERROG EVL PM/LDLS PM: CPT

## 2025-03-27 PROCEDURE — 93296 REM INTERROG EVL PM/IDS: CPT

## 2025-06-26 ENCOUNTER — APPOINTMENT (OUTPATIENT)
Dept: ELECTROPHYSIOLOGY | Facility: CLINIC | Age: 80
End: 2025-06-26